# Patient Record
Sex: FEMALE | Race: WHITE | Employment: FULL TIME | URBAN - METROPOLITAN AREA
[De-identification: names, ages, dates, MRNs, and addresses within clinical notes are randomized per-mention and may not be internally consistent; named-entity substitution may affect disease eponyms.]

---

## 2018-11-22 ENCOUNTER — APPOINTMENT (OUTPATIENT)
Dept: CT IMAGING | Age: 48
End: 2018-11-22
Payer: COMMERCIAL

## 2018-11-22 ENCOUNTER — HOSPITAL ENCOUNTER (OUTPATIENT)
Age: 48
Setting detail: OBSERVATION
Discharge: HOME | End: 2018-11-23
Attending: EMERGENCY MEDICINE | Admitting: INTERNAL MEDICINE
Payer: COMMERCIAL

## 2018-11-22 DIAGNOSIS — R42 VERTIGO: Primary | ICD-10-CM

## 2018-11-22 DIAGNOSIS — R11.2 NON-INTRACTABLE VOMITING WITH NAUSEA, UNSPECIFIED VOMITING TYPE: ICD-10-CM

## 2018-11-22 LAB
A/G RATIO: 1.4 (ref 1.1–2.2)
ALBUMIN SERPL-MCNC: 3.9 G/DL (ref 3.4–5)
ALP BLD-CCNC: 49 U/L (ref 40–129)
ALT SERPL-CCNC: 22 U/L (ref 10–40)
ANION GAP SERPL CALCULATED.3IONS-SCNC: 9 MMOL/L (ref 3–16)
AST SERPL-CCNC: 21 U/L (ref 15–37)
BASOPHILS ABSOLUTE: 0 K/UL (ref 0–0.2)
BASOPHILS RELATIVE PERCENT: 0.7 %
BILIRUB SERPL-MCNC: 0.9 MG/DL (ref 0–1)
BUN BLDV-MCNC: 12 MG/DL (ref 7–20)
CALCIUM SERPL-MCNC: 8.6 MG/DL (ref 8.3–10.6)
CHLORIDE BLD-SCNC: 100 MMOL/L (ref 99–110)
CO2: 24 MMOL/L (ref 21–32)
CREAT SERPL-MCNC: 0.6 MG/DL (ref 0.6–1.1)
EOSINOPHILS ABSOLUTE: 0 K/UL (ref 0–0.6)
EOSINOPHILS RELATIVE PERCENT: 0.9 %
GFR AFRICAN AMERICAN: >60
GFR NON-AFRICAN AMERICAN: >60
GLOBULIN: 2.8 G/DL
GLUCOSE BLD-MCNC: 104 MG/DL (ref 70–99)
HCG QUALITATIVE: NEGATIVE
HCT VFR BLD CALC: 41.9 % (ref 36–48)
HEMOGLOBIN: 14.4 G/DL (ref 12–16)
LYMPHOCYTES ABSOLUTE: 0.9 K/UL (ref 1–5.1)
LYMPHOCYTES RELATIVE PERCENT: 17.2 %
MCH RBC QN AUTO: 29.3 PG (ref 26–34)
MCHC RBC AUTO-ENTMCNC: 34.3 G/DL (ref 31–36)
MCV RBC AUTO: 85.3 FL (ref 80–100)
MONOCYTES ABSOLUTE: 0.4 K/UL (ref 0–1.3)
MONOCYTES RELATIVE PERCENT: 8.3 %
NEUTROPHILS ABSOLUTE: 3.8 K/UL (ref 1.7–7.7)
NEUTROPHILS RELATIVE PERCENT: 72.9 %
PDW BLD-RTO: 13.5 % (ref 12.4–15.4)
PLATELET # BLD: 230 K/UL (ref 135–450)
PMV BLD AUTO: 8.9 FL (ref 5–10.5)
POTASSIUM SERPL-SCNC: 3.9 MMOL/L (ref 3.5–5.1)
RBC # BLD: 4.91 M/UL (ref 4–5.2)
SODIUM BLD-SCNC: 133 MMOL/L (ref 136–145)
TOTAL PROTEIN: 6.7 G/DL (ref 6.4–8.2)
WBC # BLD: 5.2 K/UL (ref 4–11)

## 2018-11-22 PROCEDURE — 2580000003 HC RX 258: Performed by: PHYSICIAN ASSISTANT

## 2018-11-22 PROCEDURE — 96374 THER/PROPH/DIAG INJ IV PUSH: CPT

## 2018-11-22 PROCEDURE — 93005 ELECTROCARDIOGRAM TRACING: CPT | Performed by: EMERGENCY MEDICINE

## 2018-11-22 PROCEDURE — 94761 N-INVAS EAR/PLS OXIMETRY MLT: CPT

## 2018-11-22 PROCEDURE — 70450 CT HEAD/BRAIN W/O DYE: CPT

## 2018-11-22 PROCEDURE — 84443 ASSAY THYROID STIM HORMONE: CPT

## 2018-11-22 PROCEDURE — 84703 CHORIONIC GONADOTROPIN ASSAY: CPT

## 2018-11-22 PROCEDURE — 6370000000 HC RX 637 (ALT 250 FOR IP): Performed by: PHYSICIAN ASSISTANT

## 2018-11-22 PROCEDURE — G0378 HOSPITAL OBSERVATION PER HR: HCPCS

## 2018-11-22 PROCEDURE — 2580000003 HC RX 258: Performed by: INTERNAL MEDICINE

## 2018-11-22 PROCEDURE — 6360000002 HC RX W HCPCS: Performed by: PHYSICIAN ASSISTANT

## 2018-11-22 PROCEDURE — 6360000002 HC RX W HCPCS: Performed by: EMERGENCY MEDICINE

## 2018-11-22 PROCEDURE — 84439 ASSAY OF FREE THYROXINE: CPT

## 2018-11-22 PROCEDURE — 80053 COMPREHEN METABOLIC PANEL: CPT

## 2018-11-22 PROCEDURE — 85025 COMPLETE CBC W/AUTO DIFF WBC: CPT

## 2018-11-22 PROCEDURE — 96361 HYDRATE IV INFUSION ADD-ON: CPT

## 2018-11-22 PROCEDURE — 96375 TX/PRO/DX INJ NEW DRUG ADDON: CPT

## 2018-11-22 PROCEDURE — 99285 EMERGENCY DEPT VISIT HI MDM: CPT

## 2018-11-22 RX ORDER — POTASSIUM CHLORIDE 7.45 MG/ML
10 INJECTION INTRAVENOUS PRN
Status: DISCONTINUED | OUTPATIENT
Start: 2018-11-22 | End: 2018-11-23 | Stop reason: HOSPADM

## 2018-11-22 RX ORDER — SODIUM CHLORIDE 9 MG/ML
INJECTION, SOLUTION INTRAVENOUS CONTINUOUS
Status: DISCONTINUED | OUTPATIENT
Start: 2018-11-22 | End: 2018-11-23

## 2018-11-22 RX ORDER — LORAZEPAM 2 MG/ML
1 INJECTION INTRAMUSCULAR ONCE
Status: COMPLETED | OUTPATIENT
Start: 2018-11-22 | End: 2018-11-22

## 2018-11-22 RX ORDER — POTASSIUM CHLORIDE 750 MG/1
40 TABLET, EXTENDED RELEASE ORAL PRN
Status: DISCONTINUED | OUTPATIENT
Start: 2018-11-22 | End: 2018-11-23 | Stop reason: HOSPADM

## 2018-11-22 RX ORDER — 0.9 % SODIUM CHLORIDE 0.9 %
1000 INTRAVENOUS SOLUTION INTRAVENOUS ONCE
Status: COMPLETED | OUTPATIENT
Start: 2018-11-22 | End: 2018-11-22

## 2018-11-22 RX ORDER — MECLIZINE HYDROCHLORIDE 25 MG/1
25 TABLET ORAL EVERY 6 HOURS SCHEDULED
Status: DISCONTINUED | OUTPATIENT
Start: 2018-11-23 | End: 2018-11-23 | Stop reason: HOSPADM

## 2018-11-22 RX ORDER — MECLIZINE HYDROCHLORIDE CHEWABLE TABLETS 25 MG/1
50 TABLET, CHEWABLE ORAL ONCE
Status: COMPLETED | OUTPATIENT
Start: 2018-11-22 | End: 2018-11-22

## 2018-11-22 RX ORDER — SODIUM CHLORIDE 0.9 % (FLUSH) 0.9 %
10 SYRINGE (ML) INJECTION EVERY 12 HOURS SCHEDULED
Status: DISCONTINUED | OUTPATIENT
Start: 2018-11-22 | End: 2018-11-23 | Stop reason: HOSPADM

## 2018-11-22 RX ORDER — ONDANSETRON 2 MG/ML
4 INJECTION INTRAMUSCULAR; INTRAVENOUS ONCE
Status: COMPLETED | OUTPATIENT
Start: 2018-11-22 | End: 2018-11-22

## 2018-11-22 RX ORDER — ONDANSETRON 2 MG/ML
4 INJECTION INTRAMUSCULAR; INTRAVENOUS EVERY 6 HOURS PRN
Status: DISCONTINUED | OUTPATIENT
Start: 2018-11-22 | End: 2018-11-23 | Stop reason: HOSPADM

## 2018-11-22 RX ORDER — SODIUM CHLORIDE 0.9 % (FLUSH) 0.9 %
10 SYRINGE (ML) INJECTION PRN
Status: DISCONTINUED | OUTPATIENT
Start: 2018-11-22 | End: 2018-11-23 | Stop reason: HOSPADM

## 2018-11-22 RX ORDER — ACETAMINOPHEN 325 MG/1
650 TABLET ORAL EVERY 4 HOURS PRN
Status: DISCONTINUED | OUTPATIENT
Start: 2018-11-22 | End: 2018-11-23 | Stop reason: HOSPADM

## 2018-11-22 RX ADMIN — LORAZEPAM 0.5 MG: 2 INJECTION INTRAMUSCULAR; INTRAVENOUS at 14:34

## 2018-11-22 RX ADMIN — SODIUM CHLORIDE 1000 ML: 9 INJECTION, SOLUTION INTRAVENOUS at 14:35

## 2018-11-22 RX ADMIN — ONDANSETRON HYDROCHLORIDE 4 MG: 2 INJECTION, SOLUTION INTRAVENOUS at 17:45

## 2018-11-22 RX ADMIN — SODIUM CHLORIDE: 9 INJECTION, SOLUTION INTRAVENOUS at 22:37

## 2018-11-22 RX ADMIN — Medication 10 ML: at 22:37

## 2018-11-22 RX ADMIN — MECLIZINE HCL 50 MG: 25 TABLET, CHEWABLE ORAL at 14:35

## 2018-11-22 ASSESSMENT — PAIN DESCRIPTION - PAIN TYPE: TYPE: ACUTE PAIN

## 2018-11-22 ASSESSMENT — PAIN SCALES - GENERAL: PAINLEVEL_OUTOF10: 4

## 2018-11-22 NOTE — ED NOTES
1805- Perfect serve sent Dr. Hortensia Grady.      0200- Call returned by Dr. Hortensia Grady and spoke to Dr. Larisa Irvin  11/22/18 1642

## 2018-11-22 NOTE — ED PROVIDER NOTES
[address] ED  eMERGENCY dEPARTMENT eNCOUnter        Pt Name: Jeremiah Barrow  MRN: [id number]  [place] 1970  Date of evaluation: 11/22/2018  Provider: Fernando Jones PA-C  PCP: No primary care provider on file. This patient WAS seen and evaluated by the attending physician Cortez Rowland MD.            [facility]       Chief Complaint   Patient presents with    Dizziness     pt c/o dizziness and vomiting that started this morning.  Emesis       HISTORY OF PRESENT ILLNESS   (Location/Symptom, Timing/Onset, Context/Setting, Quality, Duration, Modifying Factors, Severity)  Note limiting factors. Jeremiah Barrow is a 53 y.o. female  presents to the emergency department with dizziness and vomiting that started this morning. The patient states that she has had this previously a few years ago and was diagnosed with vertigo. She states that she has no dizziness or symptoms if she is lying still, but when she sits up or walks her symptoms come on. She denies headache, fever, chills, cough, congestion, otalgia, ear pressure, chest pain, shortness of breath or abdominal pain. She denies visual change, numbness, tingling, weakness or confusion. She states that she is having some photophobia. Nursing Notes were all reviewed and agreed with or any disagreements were addressed  in the HPI. REVIEW OF SYSTEMS    (2-9 systems for level 4, 10 or more for level 5)     Review of Systems    Positives and Pertinent negatives as per HPI. Except as noted abovein the ROS, all other systems were reviewed and negative.        PAST MEDICAL HISTORY     Past Medical History:   Diagnosis Date    Anxiety     Depression     Dizziness     Thyroid disease          SURGICAL HISTORY     Past Surgical History:   Procedure Laterality Date    BREAST ENHANCEMENT SURGERY      CHOLECYSTECTOMY      COLONOSCOPY      DILATION AND CURETTAGE OF UTERUS      FACIAL RECONSTRUCTION SURGERY

## 2018-11-23 VITALS
TEMPERATURE: 98.9 F | WEIGHT: 222.9 LBS | SYSTOLIC BLOOD PRESSURE: 124 MMHG | RESPIRATION RATE: 18 BRPM | BODY MASS INDEX: 35.82 KG/M2 | OXYGEN SATURATION: 96 % | HEIGHT: 66 IN | DIASTOLIC BLOOD PRESSURE: 82 MMHG | HEART RATE: 75 BPM

## 2018-11-23 LAB
ANION GAP SERPL CALCULATED.3IONS-SCNC: 8 MMOL/L (ref 3–16)
BUN BLDV-MCNC: 10 MG/DL (ref 7–20)
CALCIUM SERPL-MCNC: 8.1 MG/DL (ref 8.3–10.6)
CHLORIDE BLD-SCNC: 102 MMOL/L (ref 99–110)
CO2: 24 MMOL/L (ref 21–32)
CREAT SERPL-MCNC: 0.6 MG/DL (ref 0.6–1.1)
EKG ATRIAL RATE: 81 BPM
EKG DIAGNOSIS: NORMAL
EKG P AXIS: 23 DEGREES
EKG P-R INTERVAL: 132 MS
EKG Q-T INTERVAL: 376 MS
EKG QRS DURATION: 82 MS
EKG QTC CALCULATION (BAZETT): 436 MS
EKG R AXIS: 32 DEGREES
EKG T AXIS: 194 DEGREES
EKG VENTRICULAR RATE: 81 BPM
GFR AFRICAN AMERICAN: >60
GFR NON-AFRICAN AMERICAN: >60
GLUCOSE BLD-MCNC: 102 MG/DL (ref 70–99)
MAGNESIUM: 2.1 MG/DL (ref 1.8–2.4)
POTASSIUM REFLEX MAGNESIUM: 3.3 MMOL/L (ref 3.5–5.1)
SODIUM BLD-SCNC: 134 MMOL/L (ref 136–145)
T4 FREE: 0.8 NG/DL (ref 0.9–1.8)
TSH REFLEX: 11.42 UIU/ML (ref 0.27–4.2)

## 2018-11-23 PROCEDURE — 6370000000 HC RX 637 (ALT 250 FOR IP): Performed by: INTERNAL MEDICINE

## 2018-11-23 PROCEDURE — G0378 HOSPITAL OBSERVATION PER HR: HCPCS

## 2018-11-23 PROCEDURE — G8979 MOBILITY GOAL STATUS: HCPCS

## 2018-11-23 PROCEDURE — 6360000002 HC RX W HCPCS: Performed by: INTERNAL MEDICINE

## 2018-11-23 PROCEDURE — 97530 THERAPEUTIC ACTIVITIES: CPT

## 2018-11-23 PROCEDURE — 96361 HYDRATE IV INFUSION ADD-ON: CPT

## 2018-11-23 PROCEDURE — 93010 ELECTROCARDIOGRAM REPORT: CPT | Performed by: INTERNAL MEDICINE

## 2018-11-23 PROCEDURE — 97162 PT EVAL MOD COMPLEX 30 MIN: CPT

## 2018-11-23 PROCEDURE — G8980 MOBILITY D/C STATUS: HCPCS

## 2018-11-23 PROCEDURE — 83735 ASSAY OF MAGNESIUM: CPT

## 2018-11-23 PROCEDURE — 36415 COLL VENOUS BLD VENIPUNCTURE: CPT

## 2018-11-23 PROCEDURE — 95992 CANALITH REPOSITIONING PROC: CPT

## 2018-11-23 PROCEDURE — G8978 MOBILITY CURRENT STATUS: HCPCS

## 2018-11-23 PROCEDURE — 99217 PR OBSERVATION CARE DISCHARGE MANAGEMENT: CPT | Performed by: INTERNAL MEDICINE

## 2018-11-23 PROCEDURE — 80048 BASIC METABOLIC PNL TOTAL CA: CPT

## 2018-11-23 RX ORDER — MECLIZINE HYDROCHLORIDE 25 MG/1
25 TABLET ORAL 3 TIMES DAILY
Qty: 30 TABLET | Refills: 0 | Status: SHIPPED | OUTPATIENT
Start: 2018-11-23 | End: 2018-12-03

## 2018-11-23 RX ORDER — ONDANSETRON 4 MG/1
4 TABLET, ORALLY DISINTEGRATING ORAL 3 TIMES DAILY PRN
Qty: 21 TABLET | Refills: 0 | Status: SHIPPED | OUTPATIENT
Start: 2018-11-23 | End: 2019-04-29 | Stop reason: ALTCHOICE

## 2018-11-23 RX ADMIN — MECLIZINE HYDROCHLORIDE 25 MG: 25 TABLET ORAL at 06:35

## 2018-11-23 RX ADMIN — ACETAMINOPHEN 650 MG: 325 TABLET ORAL at 14:38

## 2018-11-23 RX ADMIN — ONDANSETRON 4 MG: 2 INJECTION INTRAMUSCULAR; INTRAVENOUS at 14:38

## 2018-11-23 RX ADMIN — MECLIZINE HYDROCHLORIDE 25 MG: 25 TABLET ORAL at 01:27

## 2018-11-23 RX ADMIN — MECLIZINE HYDROCHLORIDE 25 MG: 25 TABLET ORAL at 11:38

## 2018-11-23 ASSESSMENT — PAIN SCALES - GENERAL: PAINLEVEL_OUTOF10: 4

## 2018-11-23 NOTE — PROGRESS NOTES
Patient resting quietly at this time. PT states they will see her sometime after lunch. Will continue to monitor.

## 2018-11-25 RX ORDER — LEVOTHYROXINE SODIUM 0.05 MG/1
50 TABLET ORAL DAILY
Qty: 30 TABLET | Refills: 2 | Status: SHIPPED | OUTPATIENT
Start: 2018-11-25

## 2019-04-29 ENCOUNTER — APPOINTMENT (OUTPATIENT)
Dept: CT IMAGING | Age: 49
End: 2019-04-29
Payer: COMMERCIAL

## 2019-04-29 ENCOUNTER — HOSPITAL ENCOUNTER (EMERGENCY)
Age: 49
Discharge: HOME | End: 2019-04-29
Attending: EMERGENCY MEDICINE
Payer: COMMERCIAL

## 2019-04-29 VITALS
RESPIRATION RATE: 16 BRPM | OXYGEN SATURATION: 98 % | SYSTOLIC BLOOD PRESSURE: 127 MMHG | DIASTOLIC BLOOD PRESSURE: 78 MMHG | TEMPERATURE: 99.2 F | HEART RATE: 84 BPM

## 2019-04-29 DIAGNOSIS — R10.32 ABDOMINAL PAIN, LEFT LOWER QUADRANT: Primary | ICD-10-CM

## 2019-04-29 DIAGNOSIS — K52.9 COLITIS: ICD-10-CM

## 2019-04-29 LAB
A/G RATIO: 1.3 (ref 1.1–2.2)
ALBUMIN SERPL-MCNC: 3.9 G/DL (ref 3.4–5)
ALP BLD-CCNC: 45 U/L (ref 40–129)
ALT SERPL-CCNC: 10 U/L (ref 10–40)
AMORPHOUS: ABNORMAL /HPF
ANION GAP SERPL CALCULATED.3IONS-SCNC: 12 MMOL/L (ref 3–16)
AST SERPL-CCNC: 10 U/L (ref 15–37)
BACTERIA: ABNORMAL /HPF
BASOPHILS ABSOLUTE: 0.1 K/UL (ref 0–0.2)
BASOPHILS RELATIVE PERCENT: 0.7 %
BILIRUB SERPL-MCNC: 1.2 MG/DL (ref 0–1)
BILIRUBIN URINE: ABNORMAL
BLOOD, URINE: ABNORMAL
BUN BLDV-MCNC: 9 MG/DL (ref 7–20)
CALCIUM SERPL-MCNC: 8.4 MG/DL (ref 8.3–10.6)
CHLORIDE BLD-SCNC: 99 MMOL/L (ref 99–110)
CLARITY: ABNORMAL
CO2: 25 MMOL/L (ref 21–32)
COLOR: ABNORMAL
CREAT SERPL-MCNC: 0.7 MG/DL (ref 0.6–1.1)
EOSINOPHILS ABSOLUTE: 0.1 K/UL (ref 0–0.6)
EOSINOPHILS RELATIVE PERCENT: 1 %
EPITHELIAL CELLS, UA: ABNORMAL /HPF
GFR AFRICAN AMERICAN: >60
GFR NON-AFRICAN AMERICAN: >60
GLOBULIN: 3.1 G/DL
GLUCOSE BLD-MCNC: 122 MG/DL (ref 70–99)
GLUCOSE URINE: NEGATIVE MG/DL
HCG QUALITATIVE: NEGATIVE
HCT VFR BLD CALC: 36.1 % (ref 36–48)
HEMOGLOBIN: 12.5 G/DL (ref 12–16)
KETONES, URINE: NEGATIVE MG/DL
LEUKOCYTE ESTERASE, URINE: NEGATIVE
LYMPHOCYTES ABSOLUTE: 1.3 K/UL (ref 1–5.1)
LYMPHOCYTES RELATIVE PERCENT: 12.1 %
MCH RBC QN AUTO: 29 PG (ref 26–34)
MCHC RBC AUTO-ENTMCNC: 34.6 G/DL (ref 31–36)
MCV RBC AUTO: 83.8 FL (ref 80–100)
MICROSCOPIC EXAMINATION: YES
MONOCYTES ABSOLUTE: 0.9 K/UL (ref 0–1.3)
MONOCYTES RELATIVE PERCENT: 8.4 %
MUCUS: ABNORMAL /LPF
NEUTROPHILS ABSOLUTE: 8.7 K/UL (ref 1.7–7.7)
NEUTROPHILS RELATIVE PERCENT: 77.8 %
NITRITE, URINE: NEGATIVE
OCCULT BLOOD DIAGNOSTIC: NORMAL
PDW BLD-RTO: 13.4 % (ref 12.4–15.4)
PH UA: 5.5 (ref 5–8)
PLATELET # BLD: 278 K/UL (ref 135–450)
PMV BLD AUTO: 7.5 FL (ref 5–10.5)
POTASSIUM SERPL-SCNC: 3.6 MMOL/L (ref 3.5–5.1)
PROTEIN UA: ABNORMAL MG/DL
RBC # BLD: 4.31 M/UL (ref 4–5.2)
RBC UA: ABNORMAL /HPF (ref 0–2)
SODIUM BLD-SCNC: 136 MMOL/L (ref 136–145)
SPECIFIC GRAVITY UA: 1.02 (ref 1–1.03)
TOTAL PROTEIN: 7 G/DL (ref 6.4–8.2)
URINE TYPE: ABNORMAL
UROBILINOGEN, URINE: 0.2 E.U./DL
WBC # BLD: 11.2 K/UL (ref 4–11)
WBC UA: ABNORMAL /HPF (ref 0–5)

## 2019-04-29 PROCEDURE — 85025 COMPLETE CBC W/AUTO DIFF WBC: CPT

## 2019-04-29 PROCEDURE — 84703 CHORIONIC GONADOTROPIN ASSAY: CPT

## 2019-04-29 PROCEDURE — 80053 COMPREHEN METABOLIC PANEL: CPT

## 2019-04-29 PROCEDURE — 74177 CT ABD & PELVIS W/CONTRAST: CPT

## 2019-04-29 PROCEDURE — 99284 EMERGENCY DEPT VISIT MOD MDM: CPT

## 2019-04-29 PROCEDURE — 81001 URINALYSIS AUTO W/SCOPE: CPT

## 2019-04-29 PROCEDURE — G0328 FECAL BLOOD SCRN IMMUNOASSAY: HCPCS

## 2019-04-29 PROCEDURE — 6360000004 HC RX CONTRAST MEDICATION: Performed by: EMERGENCY MEDICINE

## 2019-04-29 PROCEDURE — 6370000000 HC RX 637 (ALT 250 FOR IP): Performed by: EMERGENCY MEDICINE

## 2019-04-29 RX ORDER — CIPROFLOXACIN 500 MG/1
500 TABLET, FILM COATED ORAL ONCE
Status: COMPLETED | OUTPATIENT
Start: 2019-04-29 | End: 2019-04-29

## 2019-04-29 RX ORDER — CIPROFLOXACIN 2 MG/ML
400 INJECTION, SOLUTION INTRAVENOUS ONCE
Status: DISCONTINUED | OUTPATIENT
Start: 2019-04-29 | End: 2019-04-29

## 2019-04-29 RX ORDER — METRONIDAZOLE 250 MG/1
500 TABLET ORAL 3 TIMES DAILY
Qty: 60 TABLET | Refills: 0 | Status: SHIPPED | OUTPATIENT
Start: 2019-04-29 | End: 2019-05-09

## 2019-04-29 RX ORDER — METRONIDAZOLE 250 MG/1
500 TABLET ORAL ONCE
Status: COMPLETED | OUTPATIENT
Start: 2019-04-29 | End: 2019-04-29

## 2019-04-29 RX ORDER — LORAZEPAM 0.5 MG/1
0.5 TABLET ORAL PRN
COMMUNITY
Start: 2017-10-02

## 2019-04-29 RX ORDER — CIPROFLOXACIN 500 MG/1
500 TABLET, FILM COATED ORAL 2 TIMES DAILY
Qty: 20 TABLET | Refills: 0 | Status: SHIPPED | OUTPATIENT
Start: 2019-04-29 | End: 2019-05-09

## 2019-04-29 RX ADMIN — CIPROFLOXACIN HYDROCHLORIDE 500 MG: 500 TABLET, FILM COATED ORAL at 13:35

## 2019-04-29 RX ADMIN — METRONIDAZOLE 500 MG: 250 TABLET ORAL at 13:35

## 2019-04-29 RX ADMIN — IOPAMIDOL 75 ML: 755 INJECTION, SOLUTION INTRAVENOUS at 10:57

## 2019-04-29 ASSESSMENT — ENCOUNTER SYMPTOMS
RHINORRHEA: 0
CHEST TIGHTNESS: 0
WHEEZING: 0
VOMITING: 0
RECTAL PAIN: 1
DIARRHEA: 0
TROUBLE SWALLOWING: 0
STRIDOR: 0
COUGH: 0
SORE THROAT: 0
ABDOMINAL PAIN: 1
SHORTNESS OF BREATH: 0

## 2019-04-29 ASSESSMENT — PAIN SCALES - GENERAL: PAINLEVEL_OUTOF10: 7

## 2019-04-29 ASSESSMENT — PAIN DESCRIPTION - ORIENTATION: ORIENTATION: LEFT;LOWER

## 2019-04-29 ASSESSMENT — PAIN DESCRIPTION - PAIN TYPE: TYPE: ACUTE PAIN

## 2019-04-29 ASSESSMENT — PAIN DESCRIPTION - LOCATION: LOCATION: ABDOMEN

## 2019-04-29 NOTE — ED NOTES
Rectal exam done by  [facility] with [facility] tech at bedside.       Cholo Saeed, RN  04/29/19 8769

## 2019-04-29 NOTE — ED PROVIDER NOTES
[address] [facility]  ED  eMERGENCYdEPARTMENT eNCOUnter      Pt Name: Cal Mayes  MRN: [id number]  [place] 1970  Date of evaluation: 4/29/2019  Macho Serra MD    [address] [place] [address]       Chief Complaint   Patient presents with    Abdominal Pain     LLQ abd pain intermittently for several weeks. severe x 3 days. has also had some rectal pain         HISTORY OF PRESENT ILLNESS   (Location/Symptom, Timing/Onset,Context/Setting, Quality, Duration, Modifying Factors, Severity)  Note limiting factors. Cal Mayes is a 51 y.o. female who presents to the emergency department for abdominal pain. Patient reports intermittent 'abdominal discomfort' to LLQ, sharp/stabbing that started on Thursday, reports improvement with Ibuprofen however has been ogoing x 2-3 months. . Denies aggravating factors.   -Also reports rectal pain, states when she sits she feels like sharp pains 'up the rectum.' Had diarrhea last night, improved with imodium. patient reports associated rectal pain, pain on defecation and pain when sitting. HPI    Nursing Notes were reviewed. REVIEW OF SYSTEMS    (2-9 systems for level 4, 10 or more for level 5)     Review of Systems   Constitutional: Negative for activity change, appetite change, diaphoresis and fever. HENT: Negative for congestion, rhinorrhea, sore throat and trouble swallowing. Respiratory: Negative for cough, chest tightness, shortness of breath, wheezing and stridor. Cardiovascular: Negative for chest pain and palpitations. Gastrointestinal: Positive for abdominal pain and rectal pain. Negative for diarrhea and vomiting. Genitourinary: Negative for dysuria and flank pain. Skin: Negative for rash and wound. Neurological: Negative for dizziness, weakness, light-headedness, numbness and headaches. Except as noted above the remainder of the review of systems was reviewedand negative.        PAST MEDICAL HISTORY     Past Medical History: Diagnosis Date    Anxiety     Depression     Dizziness     Thyroid disease          SURGICAL HISTORY       Past Surgical History:   Procedure Laterality Date    BREAST ENHANCEMENT SURGERY      CHOLECYSTECTOMY      COLONOSCOPY      DILATION AND CURETTAGE OF UTERUS      FACIAL RECONSTRUCTION SURGERY  1999    dog attack    TUBAL LIGATION      WISDOM TOOTH EXTRACTION           CURRENT MEDICATIONS       Discharge Medication List as of 4/29/2019 12:37 PM      CONTINUE these medications which have NOT CHANGED    Details   LORazepam (ATIVAN) 0.5 MG tablet Take 0.5 mg by mouth as needed. Historical Med      levothyroxine (SYNTHROID) 50 MCG tablet Take 1 tablet by mouth daily, Disp-30 tablet, R-2Normal             ALLERGIES     Codeine; Compazine [prochlorperazine maleate]; Doxycycline; Percocet [oxycodone-acetaminophen]; and Vicodin [hydrocodone-acetaminophen]    FAMILY HISTORY     History reviewed. No pertinent family history.        SOCIAL HISTORY       Social History     Socioeconomic History    Marital status:      Spouse name: None    Number of children: None    Years of education: None    Highest education level: None   Occupational History    None   Social Needs    Financial resource strain: None    Food insecurity:     Worry: None     Inability: None    Transportation needs:     Medical: None     Non-medical: None   Tobacco Use    Smoking status: Never Smoker    Smokeless tobacco: Never Used   Substance and Sexual Activity    Alcohol use: No    Drug use: No    Sexual activity: None   Lifestyle    Physical activity:     Days per week: None     Minutes per session: None    Stress: None   Relationships    Social connections:     Talks on phone: None     Gets together: None     Attends Jew service: None     Active member of club or organization: None     Attends meetings of clubs or organizations: None     Relationship status: None    Intimate partner violence:     Fear of - Abnormal; Notable for the following components:    Mucus, UA 3+ (*)     Bacteria, UA 2+ (*)     Amorphous, UA Rare (*)     All other components within normal limits    Narrative:     Performed at:  [address], [facility]   Phone [phone]   HCG, SERUM, QUALITATIVE    Narrative:     Performed at:  [address], [facility]   Phone [phone]   BLOOD OCCULT STOOL DIAGNOSTIC    Narrative:     ORDER#: [id number]                          ORDERED BY: Lora Camara  SOURCE: Stool                              COLLECTED:  04/29/19 11:59  ANTIBIOTICS AT ELIER.:                      RECEIVED :  04/29/19 12:07  Performed at:  [address], [facility]   Phone [phone]       All other labs were within normal range ornot returned as of this dictation. EMERGENCY DEPARTMENT COURSE and DIFFERENTIAL DIAGNOSIS/MDM:   Vitals:    Vitals:    04/29/19 0951 04/29/19 1140 04/29/19 1355   BP: (!) 138/95 134/75 127/78   Pulse: 97 84 84   Resp: 18 16 16   Temp: 99.2 °F (37.3 °C)     TempSrc: Oral     SpO2: 98% 99% 98%         MDM    ED COURSE/MDM    -Millie Collet is a 51 y.o. female presents to ED for LLQ and rectal pain x 2-3 months.   -Patient seen and evaluated. Oldrecords reviewed. Labs and imaging reviewed and results discussed with patient. Workup was significant for mildly elevated bilirubin of 1.2 and mild leukocytosis of 11.2  -negative ua  -negative hemoccult  -CT ap: Diffuse sigmoid colitis without evidence for viscus perforation. Surrounding inflammation and an capsulated fluid. Direct visualization is should be considered following appropriate therapy to exclude underlying mass.    -patient refused pain medication   -was given ciprofloxacin and flagyl in ED  -Discussed finding of colitis on CT and plan for discharge home with close follow up with PCP/GI referral and rx ciprofloxacin and flagyl was discussed with patient and family. Strict ED return precautions given for new/worsending symptoms. Patient and family in agreement withplan, verbally confirm understanding and have no further questions/concerns. REASSESSMENT      Well appearing, non toxic, alert, oriented speaking in full sentences and hemodynamically stable upon discharge      CRITICAL CARE TIME   Total Critical Care time was 0 minutes, excluding separately reportableprocedures. There was a high probability of clinicallysignificant/life threatening deterioration in the patient's condition which required my urgent intervention. CONSULTS:  None    PROCEDURES:  Unless otherwise noted below, none     Procedures    FINAL IMPRESSION      1. Abdominal pain, left lower quadrant    2.  Colitis          DISPOSITION/PLAN   DISPOSITION        PATIENT REFERREDTO:  [address] [facility]  [phone]    Call in 1 day        DISCHARGE MEDICATIONS:  Discharge Medication List as of 4/29/2019 12:37 PM      START taking these medications    Details   ciprofloxacin (CIPRO) 500 MG tablet Take 1 tablet by mouth 2 times daily for 10 days, Disp-20 tablet, R-0Print      metroNIDAZOLE (FLAGYL) 250 MG tablet Take 2 tablets by mouth 3 times daily for 10 days, Disp-60 tablet, R-0Print                (Please note that portions of this note were completed with a voice recognition program.  Efforts were made to edit the dictations but occasionally wordsare mis-transcribed.)    Luís Campos MD (electronically signed)  Attending Emergency Physician            Luís Campos MD  04/29/19 [place]

## 2019-04-29 NOTE — ED NOTES
Pt states that she has been having left sided abdominal pain off and on for the past few months and had diarrhea on Friday evening and that was her last bowel movement. Pt states that this episode started on Thursday.       Lupe Edmond RN  04/29/19 6167

## 2019-04-29 NOTE — ED NOTES
[facility] at bedside to see pt.  Assessed per MD.        Cholo Saeed, RN  04/29/19 5787

## 2019-07-11 ENCOUNTER — HOSPITAL ENCOUNTER (OUTPATIENT)
Dept: PHYSICAL THERAPY | Age: 49
Setting detail: THERAPIES SERIES
Discharge: HOME | End: 2019-07-11
Payer: COMMERCIAL

## 2019-07-18 ENCOUNTER — HOSPITAL ENCOUNTER (OUTPATIENT)
Dept: PHYSICAL THERAPY | Age: 49
Setting detail: THERAPIES SERIES
Discharge: HOME | End: 2019-07-18
Payer: COMMERCIAL

## 2019-07-18 PROCEDURE — 97112 NEUROMUSCULAR REEDUCATION: CPT

## 2019-07-18 NOTE — PROGRESS NOTES
The following patient has been evaluated for physical therapy services. In order for therapy to continue treatment, Medicare requires physician review of the treatment plan. Please review the attached evaluation and/or summary of the patient's plan of care, and verify that you agree therapy should continue by signing below and sending it back to our office. Thank you for this referral.    Physician signature_______________________ Date________________    Fax to:   [facility] [phone]                PHYSICAL THERAPY VESTIBULAR EVALUATION    Evaluation Date:  7/18/2019         Patient Name:  Jody Edmonds       YOB: 1970       Medical Diagnosis: Dizziness        ICD 10:  R42    Treatment Diagnosis:  Dizziness with R side BPPV    Onset Date: 7/7/19     Referral Date:  Self referred. Will route evaluation to PCP     Referring Physician:          Visits Allowed/Insurance/Certification Information:   [employer]    Restrictions/Precautions:  No restrictions    Social support/Environment:     Family/caregiver support:   [x]Yes   []No    Health History reviewed with pt:    [x]Yes     []No  Hx of HTN? []Yes     [x]No  Hx of cardiovascular problems? []Yes     [x]No  Hx of CVA/TIA? []Yes     [x]No     Patient goal for therapy:  \" get rid of this dizziness \"      SUBJECTIVE FINDINGS      History of Present Illness:    Pt states that she began to feel dizzy on Sunday of this week. She has had several spells of BPPV in the past, and this feel exactly the same at those episodes. For one of those spells, she was admitted to the hospital, and was treated for BPPV as an inpatient. She states that each time, the repositioning technique has fixed the problem. Presents today with similar symptoms. Dizziness is provoked with change of position, or with looking up.   States that she would like to get this fixed so that she does not end up in []Yes     [x]No  Describe:   (Time of day, activity, location, frequency, injury sustained)    Does pt complain of stumble, stagger, or side step while walking? [x]Yes     []No  Does pt complain of drift to one side while walking? []Yes     [x]No    Limitations (hearing/vision loss/other):    Does pt wear glasses/contacts? []Yes     [x]No  []Reading     []Distance     []Bifocals     []Trifocals     []Prisms   Does pt have chronic hearing loss? []Yes     [x]No  Describe:   Does pt wear hearing aids? []Yes     [x]No    Pain:     []Yes     [x]No  Location?        Pain rating currently:     /10  Pain rating at its worst:     /10       OBJECTIVE FINDINGS      Posture/Alignment:  WNL    Cervical AROM:    [x]WNL     []WFL     []Impaired     []Painful  If impaired, describe:     Vertebral Artery test in sitting position:     [x]Negative     []Positive   []Not tested    Oculomotor Examination:    Room Light:   Spontaneous Nystagmus:  []Yes   [x]No     Direction:    Gaze Holding Nystagmus:  []Yes   [x]No     []Direction fixed    []Direction changing  Eye Movement Range:  [x]Conjugate     []Disconjugate     []Diplopia at end range  Vergence:       [x]WNL     []Abnormal (diplopia or disconjugate @ >10 cm)  Smooth Pursuits:     [x]WNL    []Abnormal/saccadic intrusions noted     Describe:     []horizontal pursuit   []vertical pursuit       Saccades:       [x]WNL (2 or less)     []Slow velocity     []Impaired accuracy    (overshooting/gross undershooting)  VOR (Cancellation):  [x]WNL     []Abnormal (saccadic intrusions/lack of fixation)  VOR (Slow):      [x]WNL     []Abnormal  Head Impulse Test/ Head Thrust Test:     [x]Negative         []Positive to the right     []Positive to the left         []Positive with far target to the  []Right     []Left     Coordination Testing:    Finger to Nose:        [x]WNL     []Impaired  Alternating pronation/supination:  [x]WNL     []Impaired  Heel to shin (sitting or

## 2019-07-18 NOTE — FLOWSHEET NOTE
minutes    Functional Outcome Measure:     Measure Used: [facility]  Date Assessed:7/11/19  Score: 36    Assessment/Treatment/Activity Tolerance:    Patients response to treatment:   [x]Patient tolerated treatment well []Patient limited by fatigue   []Patient limited by pain  []Patient limited by other medical complications   []Other:     GOALS      Short Term Goals:  2   weeks Long Term Goals:   4 weeks   1). Tolerate repositioning  1). Negative repeat assesments for BPPV in all canals   2). Establish HEP 2). Indep with HEP   3). Decreased functional complaints due to dizziness 3). Return to full functional mobility with no limitation due to dizziness   4).           Prognosis: [x]Good   []Fair   []Poor    Patient Requires Follow-up:  [x]Yes  []No    Plan: [x]Plan of care initiated     []Continue per plan of care    [] Alter current plan (see comments)    []Hold pending MD visit []Discharge    Timed Code Treatment Minutes:  30    Total Treatment Minutes:  60      Electronically signed by:  Julieta Torres, PT, MPT, OMT-c [id number]

## 2019-10-24 ENCOUNTER — HOSPITAL ENCOUNTER (OUTPATIENT)
Dept: PHYSICAL THERAPY | Age: 49
Setting detail: THERAPIES SERIES
Discharge: HOME | End: 2019-10-24
Payer: COMMERCIAL

## 2019-10-24 PROCEDURE — 95992 CANALITH REPOSITIONING PROC: CPT

## 2019-10-24 PROCEDURE — 97162 PT EVAL MOD COMPLEX 30 MIN: CPT

## 2020-03-12 ENCOUNTER — HOSPITAL ENCOUNTER (OUTPATIENT)
Dept: PHYSICAL THERAPY | Age: 50
Setting detail: THERAPIES SERIES
Discharge: HOME | End: 2020-03-12
Payer: COMMERCIAL

## 2020-03-12 PROCEDURE — 95992 CANALITH REPOSITIONING PROC: CPT

## 2020-03-12 PROCEDURE — 97112 NEUROMUSCULAR REEDUCATION: CPT

## 2020-03-12 PROCEDURE — 97162 PT EVAL MOD COMPLEX 30 MIN: CPT

## 2020-03-12 NOTE — PROGRESS NOTES
[x]Spontaneous    [x]Induced by motion     []Induced by position changes    Pt experiences disequilibrium? [x]Yes     []No    Symptoms worse with:     []Movement of the visual environment     [x]Self motion  []Complex visual environments     []Visual patterns     []Visual tasks (reading)    Associated hearing/ ear symptoms:      []Yes     [x]No    Does pt have chronic hearing loss? []Yes     [x]No    Does pt wear hearing aids? []Yes     [x]No    Any recent illness or infections? []Yes     [x]No    Any recent accidents or head trauma? []Yes     [x]No    Any history of migraines or headaches? []Yes     [x]No    Current Functional Limitations:     Functional complaints:   Unalbe to walk without loss of balance,  Cannot look down or bend forward due to nausea and severe vertigo   PLOF:   Fully Indep      Pt's sleep is affected:  Room spins with rolling to side. History of Prior Therapy/Testing (e.g. MRI):  Multiple past bouts of Vertigo, testing (+) for BPPV each episode. Treated and responds well to CRT. Medications:    Pt h/o or currently taking any vestibular suppressants? [x]No , did take zofran due to nausea     Pt aware of any medications that may cause dizziness? []Yes     [x]No      History of Falls or near Falls:    Any falls to the ground? []Yes     [x]No  Any near falls? []Yes     [x]No  Does pt complain of stumble, stagger, or side step while walking? []Yes     [x]No  Does pt complain of drift to one side while walking?         []Yes     [x]No    Pain     [x]NA       OBJECTIVE FINDINGS      Cervical AROM:    [x]WNL        Vertebral Artery test in sitting position:     [x]Negative         Positional Testing:     Right Ximena Hallpike:    []Negative     [x]Vertigo     [x]Nystagmus     Describe:  torsional upbeating with 20 sec latency period lasting greater than 30 sec  Left [place] Hallpike:      []Negative     [x]Vertigo     [x]Nystagmus     Describe: Lateral   Right Roll test:      []Negative     [x]Vertigo     [x]Nystagmus     Describe:  Geotropic   Left Roll test:      []Negative     [x]Vertigo     [x]Nystagmus     Describe:  Grotropic, more pronounced than R. Provokes nausea  Head Center Test:    [x]Negative     []Vertigo     []Nystagmus     Describe:       ASSESSMENT  : Pt presents this date with c/o sudden onset of room spinning vertigo that began yesterday after a long car ride. Pt did attempt to reposition the crystals by doing a self CRT, but still feels very dizzy. Assessment this date reveals multi canalithiasis with the greatest provocation occurring with the LEFT Roll test.     Problems  [x]Positive for BPPV   [x]Positive for motion sensitivity      Rehabilitation Potential:  Good for goals listed below. Strengths for achieving goals include:  [x]Pt motivated   []PLOF   []Family support   Limitations for achieving goals include:  []None   [x]Severity of condition     []Cognitive   []Lack of family support   []Lack of resources     []Other:         Prognosis: [x]Good   []Fair   []Poor    GOALS      Short Term Goals:   2  weeks Long Term Goals:  4  weeks   1). Reassess for BPPV 1). Negative in all planes   2). Decreased subjective c/o dizziness 2). No further c/o dizziness    3). 3).   4). 4).   5). 5).   6). 6). PLAN OF CARE  To see patient  1-x/week for  4 weeks for the following treatment interventions:    [x]Canalith Repositioning Procedures for BPPV  [x]Neuromuscular Re-education:  []Gaze Stability Ex  []Balance Ex   []Habituation Ex  []Therapeutic Exercise   []Gait Training   []Manual Therapy  []Therapeutic Activity   []Other: Thank you for the referral of this patient.       Timed Code Treatment Minutes:  30   minutes     Total Treatment Time:  [address] PT, MPT, OMT-c [id number]

## 2020-03-13 ENCOUNTER — APPOINTMENT (OUTPATIENT)
Dept: PHYSICAL THERAPY | Age: 50
End: 2020-03-13
Payer: COMMERCIAL

## 2020-03-14 NOTE — FLOWSHEET NOTE
Outpatient Physical Therapy     [x] Daily Treatment Note   [] Progress Note   [] Discharge Note    Date:  3/14/2020    Patient Name:  Hussein Killian         YOB: 1970    Medical Diagnosis/ICD 10:  Vertigo R42     Treatment Diagnosis:  L Horizontal canalithiasis BPPV       Onset Date:                3/11/2020     Referral Date:            3/11/2020     Referring Physician:   Self referral via direct access                                                    Visits Allowed/Insurance/Certification Information:  [employer]      Restrictions/Precautions:  No restrictions   Plan of care sent to provider:      [x]Faxed  []Co-signature    (attempts: 1[x] 2 []3[])         Plan of care signed:      []Yes date:            []No      Progress Note covers period from (if applicable):    [x]NA    []From          To           Next Progress Note due:  4/13/20     Visit# / total visits:  1/ 4    Plan for Next Session:   Reassess for BPPV      Subjective:  See eval     Pain level: No C.o   AT EVAL:       Objective:       Exercises:    Exercises in bold performed in department today. Items not bolded are carried forward from prior visits for continuity of the record. Exercise/Equipment Resistance/Repetitions HEP Other comments       []        []        []        []        []        []        []        []      Therapeutic Exercise/Home Exercise Program:   0 minutes    Therapeutic Activity:  0 minutes     Gait: 0 minutes    Neuromuscular Re-Education:  30 minutes  Pt education provided regarding anatomy and physiology associated with dx, etiology of  Symptoms and plan of care. All questions answered to pt satisfaction.      Canalith Repositioning Procedure:  Performed to correct  L Horizontal canal x2; R Horizontal canal x1    Manual Therapy:  0 minutes    Modalities: 0 minutes      Assessment/Treatment/Activity Tolerance:    Patients response to treatment:   [x]Patient tolerated treatment

## 2020-03-17 ENCOUNTER — APPOINTMENT (OUTPATIENT)
Dept: PHYSICAL THERAPY | Age: 50
End: 2020-03-17
Payer: COMMERCIAL

## 2020-12-07 ENCOUNTER — HOSPITAL ENCOUNTER (OUTPATIENT)
Dept: PHYSICAL THERAPY | Age: 50
Setting detail: THERAPIES SERIES
Discharge: HOME | End: 2020-12-07
Payer: COMMERCIAL

## 2020-12-07 ENCOUNTER — HOSPITAL ENCOUNTER (EMERGENCY)
Age: 50
Discharge: HOME | End: 2020-12-07
Attending: EMERGENCY MEDICINE
Payer: COMMERCIAL

## 2020-12-07 ENCOUNTER — APPOINTMENT (OUTPATIENT)
Dept: CT IMAGING | Age: 50
End: 2020-12-07
Payer: COMMERCIAL

## 2020-12-07 VITALS
OXYGEN SATURATION: 96 % | DIASTOLIC BLOOD PRESSURE: 82 MMHG | WEIGHT: 205 LBS | BODY MASS INDEX: 34.16 KG/M2 | HEIGHT: 65 IN | HEART RATE: 78 BPM | SYSTOLIC BLOOD PRESSURE: 130 MMHG | RESPIRATION RATE: 16 BRPM | TEMPERATURE: 97.6 F

## 2020-12-07 LAB
A/G RATIO: 1.7 (ref 1.1–2.2)
ALBUMIN SERPL-MCNC: 4.5 G/DL (ref 3.4–5)
ALP BLD-CCNC: 51 U/L (ref 40–129)
ALT SERPL-CCNC: 14 U/L (ref 10–40)
AMORPHOUS: ABNORMAL /HPF
ANION GAP SERPL CALCULATED.3IONS-SCNC: 7 MMOL/L (ref 3–16)
AST SERPL-CCNC: 15 U/L (ref 15–37)
BACTERIA: ABNORMAL /HPF
BASOPHILS ABSOLUTE: 0.1 K/UL (ref 0–0.2)
BASOPHILS RELATIVE PERCENT: 0.6 %
BILIRUB SERPL-MCNC: 1 MG/DL (ref 0–1)
BILIRUBIN URINE: NEGATIVE
BLOOD, URINE: ABNORMAL
BUN BLDV-MCNC: 11 MG/DL (ref 7–20)
CALCIUM SERPL-MCNC: 9 MG/DL (ref 8.3–10.6)
CHLORIDE BLD-SCNC: 101 MMOL/L (ref 99–110)
CLARITY: CLEAR
CO2: 28 MMOL/L (ref 21–32)
COLOR: YELLOW
CREAT SERPL-MCNC: 0.8 MG/DL (ref 0.6–1.1)
EOSINOPHILS ABSOLUTE: 0.1 K/UL (ref 0–0.6)
EOSINOPHILS RELATIVE PERCENT: 0.8 %
EPITHELIAL CELLS, UA: ABNORMAL /HPF (ref 0–5)
GFR AFRICAN AMERICAN: >60
GFR NON-AFRICAN AMERICAN: >60
GLOBULIN: 2.6 G/DL
GLUCOSE BLD-MCNC: 93 MG/DL (ref 70–99)
GLUCOSE URINE: NEGATIVE MG/DL
HCG QUALITATIVE: NEGATIVE
HCT VFR BLD CALC: 42.9 % (ref 36–48)
HEMOGLOBIN: 14.5 G/DL (ref 12–16)
KETONES, URINE: 15 MG/DL
LEUKOCYTE ESTERASE, URINE: NEGATIVE
LYMPHOCYTES ABSOLUTE: 1.6 K/UL (ref 1–5.1)
LYMPHOCYTES RELATIVE PERCENT: 16.2 %
MCH RBC QN AUTO: 29 PG (ref 26–34)
MCHC RBC AUTO-ENTMCNC: 33.7 G/DL (ref 31–36)
MCV RBC AUTO: 86.1 FL (ref 80–100)
MICROSCOPIC EXAMINATION: YES
MONOCYTES ABSOLUTE: 0.6 K/UL (ref 0–1.3)
MONOCYTES RELATIVE PERCENT: 5.7 %
MUCUS: ABNORMAL /LPF
NEUTROPHILS ABSOLUTE: 7.7 K/UL (ref 1.7–7.7)
NEUTROPHILS RELATIVE PERCENT: 76.7 %
NITRITE, URINE: NEGATIVE
PDW BLD-RTO: 13 % (ref 12.4–15.4)
PH UA: 6 (ref 5–8)
PLATELET # BLD: 277 K/UL (ref 135–450)
PMV BLD AUTO: 8.1 FL (ref 5–10.5)
POTASSIUM REFLEX MAGNESIUM: 3.8 MMOL/L (ref 3.5–5.1)
PROTEIN UA: NEGATIVE MG/DL
RBC # BLD: 4.98 M/UL (ref 4–5.2)
RBC UA: ABNORMAL /HPF (ref 0–4)
SODIUM BLD-SCNC: 136 MMOL/L (ref 136–145)
SPECIFIC GRAVITY UA: 1.02 (ref 1–1.03)
TOTAL PROTEIN: 7.1 G/DL (ref 6.4–8.2)
URINE REFLEX TO CULTURE: ABNORMAL
URINE TYPE: ABNORMAL
UROBILINOGEN, URINE: 0.2 E.U./DL
WBC # BLD: 10 K/UL (ref 4–11)
WBC UA: ABNORMAL /HPF (ref 0–5)

## 2020-12-07 PROCEDURE — 80053 COMPREHEN METABOLIC PANEL: CPT

## 2020-12-07 PROCEDURE — 2580000003 HC RX 258: Performed by: NURSE PRACTITIONER

## 2020-12-07 PROCEDURE — 85025 COMPLETE CBC W/AUTO DIFF WBC: CPT

## 2020-12-07 PROCEDURE — 84703 CHORIONIC GONADOTROPIN ASSAY: CPT

## 2020-12-07 PROCEDURE — 93005 ELECTROCARDIOGRAM TRACING: CPT | Performed by: NURSE PRACTITIONER

## 2020-12-07 PROCEDURE — 99283 EMERGENCY DEPT VISIT LOW MDM: CPT

## 2020-12-07 PROCEDURE — 70450 CT HEAD/BRAIN W/O DYE: CPT

## 2020-12-07 PROCEDURE — 6370000000 HC RX 637 (ALT 250 FOR IP): Performed by: NURSE PRACTITIONER

## 2020-12-07 PROCEDURE — 81001 URINALYSIS AUTO W/SCOPE: CPT

## 2020-12-07 PROCEDURE — 97112 NEUROMUSCULAR REEDUCATION: CPT

## 2020-12-07 PROCEDURE — 97163 PT EVAL HIGH COMPLEX 45 MIN: CPT

## 2020-12-07 RX ORDER — MECLIZINE HYDROCHLORIDE CHEWABLE TABLETS 25 MG/1
25 TABLET, CHEWABLE ORAL ONCE
Status: COMPLETED | OUTPATIENT
Start: 2020-12-07 | End: 2020-12-07

## 2020-12-07 RX ORDER — 0.9 % SODIUM CHLORIDE 0.9 %
2000 INTRAVENOUS SOLUTION INTRAVENOUS ONCE
Status: COMPLETED | OUTPATIENT
Start: 2020-12-07 | End: 2020-12-07

## 2020-12-07 RX ORDER — ONDANSETRON 4 MG/1
4 TABLET, FILM COATED ORAL EVERY 8 HOURS PRN
Qty: 20 TABLET | Refills: 0 | Status: SHIPPED | OUTPATIENT
Start: 2020-12-07

## 2020-12-07 RX ORDER — DIAZEPAM 5 MG/1
5 TABLET ORAL ONCE
Status: COMPLETED | OUTPATIENT
Start: 2020-12-07 | End: 2020-12-07

## 2020-12-07 RX ADMIN — DIAZEPAM 5 MG: 5 TABLET ORAL at 19:48

## 2020-12-07 RX ADMIN — SODIUM CHLORIDE 2000 ML: 9 INJECTION, SOLUTION INTRAVENOUS at 17:21

## 2020-12-07 RX ADMIN — MECLIZINE HCL 25 MG: 25 TABLET, CHEWABLE ORAL at 19:47

## 2020-12-07 ASSESSMENT — ENCOUNTER SYMPTOMS
SHORTNESS OF BREATH: 0
SORE THROAT: 0
RHINORRHEA: 0
ABDOMINAL PAIN: 0
COLOR CHANGE: 0

## 2020-12-07 NOTE — PROGRESS NOTES
Outpatient Physical Therapy  Hospital Phone: [phone], Hospital Fax: [phone]       To:      Emergency Dept Team      From: [facility], PT, RACHEAL, XAVIERc      Patient: Kirill Dominguez        : 1970  Diagnosis:  Dizziness, Nausea, Difficulty ambulating   Date: 2020      Physical Therapy  Note                                        Significant Findings :   · Pt presents for PT this date with c/o sudden onset room spinning vertigo and severe nausea with (+) emesis. The symptoms began yesterday am.  Pt is well known to PT with significant history of BPPV, which in the past has responded well to repositioning techniques. · Full positional assessment for BPPV demonstrated NEGATIVE reproduction of symptoms in all planes , except Downbeating nystagmus lasting approx 25 seconds in duration with LEFT Roll Test.    Downbeating nystagmus in this canal is not consistent with typical BPPV and could indicate possible central etiology. Additional workup is indicated. · Occulomotor assessment negative for peripheral vestibular hypofunction. · BP in sitting 159/100  · Significant nausea noted   · Pt unable to tolerate ambulation without assistance due to significant stagger. Patient Status:  Referred to ED for Medical workup. Pt agreeable. Pt may benefit from OP referral to ENT for VNG exam, or neuro follow up - pending results of ED assessment. Thank you for your assistance with this patient! Electronically signed by:  [facility], PT, RACHEAL, MANJINDER-c [id number]    If you have any questions or concerns, please don't hesitate to call.

## 2020-12-07 NOTE — ED PROVIDER NOTES
[address] ED  EMERGENCY DEPARTMENT ENCOUNTER        Pt Name: Derek Duffy  MRN: [id number]  [place] 1970  Date of evaluation: 12/7/2020  Provider: JAMEL Jose CNP  PCP: Mervat Selby  ED Attending: No att. providers found    [address] [facility]       Chief Complaint   Patient presents with    Dizziness     history of dizzy       HISTORY OF PRESENT ILLNESS   (Location/Symptom, Timing/Onset, Context/Setting, Quality, Duration, Modifying Factors, Severity)  Note limiting factors. Derek Duffy is a 53 y.o. female for dizziness. Onset was yesterday. Context includes pt states she has a history of BPPV. Pt states she went to PT today in an attempt to relieve her symptoms and the PT staff did not think this were BPPV symptoms and sent pt to the er. Pt states she has had this before and it is a similar resentation. She denies any fever. She does report her sense of taste, smell and hearing are heightened. Alleviating factors include nothing. Aggravating factors include nothing. Pain is 0/10. nothing has been used for pain today. Nursing Notes were all reviewed and agreed with or any disagreements were addressed  in the HPI. REVIEW OF SYSTEMS  (2-9 systems for level 4, 10 or more for level 5)     Review of Systems   Constitutional: Negative for fever. HENT: Negative for congestion, rhinorrhea and sore throat. Respiratory: Negative for shortness of breath. Cardiovascular: Negative for chest pain. Gastrointestinal: Negative for abdominal pain. Genitourinary: Negative for decreased urine volume and difficulty urinating. Musculoskeletal: Negative for arthralgias and myalgias. Skin: Negative for color change and rash. Neurological: Positive for dizziness. Negative for light-headedness. Psychiatric/Behavioral: Negative for agitation. All other systems reviewed and are negative. Positivesand Pertinent negatives as per HPI.   Except as noted above in the ROS, all other systems were reviewed and negative. PAST MEDICAL HISTORY     Past Medical History:   Diagnosis Date    Anxiety     Depression     Dizziness     Thyroid disease          SURGICAL HISTORY       Past Surgical History:   Procedure Laterality Date    BREAST ENHANCEMENT SURGERY      CHOLECYSTECTOMY      COLONOSCOPY      DILATION AND CURETTAGE OF UTERUS      FACIAL RECONSTRUCTION SURGERY  1999    dog attack    TUBAL LIGATION      WISDOM TOOTH EXTRACTION           CURRENT MEDICATIONS       Previous Medications    LEVOTHYROXINE (SYNTHROID) 50 MCG TABLET    Take 1 tablet by mouth daily    LORAZEPAM (ATIVAN) 0.5 MG TABLET    Take 0.5 mg by mouth as needed. ALLERGIES     Codeine; Compazine [prochlorperazine maleate]; Doxycycline; Percocet [oxycodone-acetaminophen]; and Vicodin [hydrocodone-acetaminophen]    FAMILY HISTORY     History reviewed. No pertinent family history.       SOCIAL HISTORY       Social History     Socioeconomic History    Marital status:      Spouse name: None    Number of children: None    Years of education: None    Highest education level: None   Occupational History    None   Social Needs    Financial resource strain: None    Food insecurity     Worry: None     Inability: None    Transportation needs     Medical: None     Non-medical: None   Tobacco Use    Smoking status: Never Smoker    Smokeless tobacco: Never Used   Substance and Sexual Activity    Alcohol use: No    Drug use: No    Sexual activity: None   Lifestyle    Physical activity     Days per week: None     Minutes per session: None    Stress: None   Relationships    Social connections     Talks on phone: None     Gets together: None     Attends Christianity service: None     Active member of club or organization: None     Attends meetings of clubs or organizations: None     Relationship status: None    Intimate partner violence     Fear of current or ex partner: None Emotionally abused: None     Physically abused: None     Forced sexual activity: None   Other Topics Concern    None   Social History Narrative    None       SCREENINGS   NIH Stroke Scale  Interval: Baseline  Level of Consciousness (1a. ): Alert  LOC Questions (1b. ): Answers both correctly  LOC Commands (1c. ): Performs both tasks correctly  Best Gaze (2. ): Normal  Visual (3. ): No visual loss  Facial Palsy (4. ): Normal symmetrical movement  Motor Arm, Left (5a. ): No drift  Motor Arm, Right (5b. ): No drift  Motor Leg, Left (6a. ): No drift  Motor Leg, Right (6b. ): No drift  Limb Ataxia (7. ): Absent  Sensory (8. ): Normal  Best Language (9. ): No aphasia  Dysarthria (10. ): Normal         PHYSICAL EXAM    (up to 7 for level 4, 8 ormore for level 5)     ED Triage Vitals [12/07/20 1636]   BP Temp Temp Source Pulse Resp SpO2 Height Weight   135/86 97.6 °F (36.4 °C) Temporal 69 16 99 % 5' 5\" (1.651 m) 205 lb (93 kg)       Physical Exam  Constitutional:       Appearance: She is well-developed. HENT:      Head: Normocephalic and atraumatic. Eyes:      Extraocular Movements: Extraocular movements intact. Pupils: Pupils are equal, round, and reactive to light. Neck:      Musculoskeletal: Normal range of motion. Cardiovascular:      Rate and Rhythm: Normal rate. Pulmonary:      Effort: Pulmonary effort is normal. No respiratory distress. Abdominal:      General: There is no distension. Palpations: Abdomen is soft. Tenderness: There is no abdominal tenderness. Musculoskeletal: Normal range of motion. Skin:     General: Skin is warm and dry. Neurological:      General: No focal deficit present. Mental Status: She is alert and oriented to person, place, and time. Cranial Nerves: No cranial nerve deficit. Sensory: No sensory deficit.          DIAGNOSTIC RESULTS   LABS:    Labs Reviewed   URINE RT REFLEX TO CULTURE - Abnormal; Notable for the following components: Result Value    Ketones, Urine 15 (*)     Blood, Urine TRACE-INTACT (*)     All other components within normal limits    Narrative:     Performed at:  [place] [address],  [place], [facility]   Phone [phone]   MICROSCOPIC URINALYSIS - Abnormal; Notable for the following components:    Mucus, UA 3+ (*)     RBC, UA 5-10 (*)     Epithelial Cells, UA 21-50 (*)     Bacteria, UA Rare (*)     All other components within normal limits    Narrative:     Performed at:  [place] [address],  [place], [facility]   Phone [phone]   CBC WITH AUTO DIFFERENTIAL    Narrative:     Performed at:  [facility]) [place] [address],  [place], [facility]   Phone [phone]   COMPREHENSIVE METABOLIC PANEL W/ REFLEX TO MG FOR LOW K    Narrative:     Performed at:  [place] [address],  [employer], [facility]   Phone [phone]   HCG, SERUM, QUALITATIVE    Narrative:     Performed at:  [place] [address],  [place], [facility]   Phone [phone]       All other labs were within normal range or not returned as of this dictation. EKG: All EKG's are interpreted by the Emergency Department Physician who either signs or Co-signs this chart in the absence of a cardiologist.  Please see their note for interpretation of EKG. RADIOLOGY:   CT head without contrast interpreted by radiologist for    FINDINGS:   BRAIN/VENTRICLES: There is no acute intracranial hemorrhage, mass effect or   midline shift.  No abnormal extra-axial fluid collection.  The gray-white   differentiation is maintained without evidence of an acute infarct.  There is   no evidence of hydrocephalus. ORBITS: The visualized portion of the orbits demonstrate no acute abnormality.      SINUSES: The visualized paranasal sinuses and mastoid air cells demonstrate   no acute abnormality. SOFT TISSUES/SKULL:  No acute abnormality of the visualized skull or soft   tissues. Interpretation per the Radiologist below, if available at the time of this note:    CT HEAD WO CONTRAST   Final Result   No acute intracranial abnormality. No results found. PROCEDURES   Unless otherwise noted below, none     Procedures    CRITICAL CARE TIME   N/A    CONSULTS:  None      EMERGENCY DEPARTMENT COURSE and DIFFERENTIAL DIAGNOSIS/MDM:   Vitals:    Vitals:    12/07/20 1636   BP: 135/86   Pulse: 69   Resp: 16   Temp: 97.6 °F (36.4 °C)   TempSrc: Temporal   SpO2: 99%   Weight: 205 lb (93 kg)   Height: 5' 5\" (1.651 m)       Patient was given the following medications:  Medications   0.9 % sodium chloride bolus (0 mLs Intravenous Stopped 12/7/20 1918)   meclizine (ANTIVERT) chewable tablet 25 mg (25 mg Oral Given 12/7/20 1947)   diazePAM (VALIUM) tablet 5 mg (5 mg Oral Given 12/7/20 1948)       She was seen and evaluated by myself and Dr. Christa Horton. Patient here for complaints of dizziness. Patient reports that she woke up yesterday with dizziness. She does have a history of BPPV. Patient states that she does not use physical therapy which helps her symptoms and went to physical therapy today. Patient reports that physical therapist was concerned that this may not be her BPPV and sent her to the ED. On exam the patient is awake alert hemodynamically stable nontoxic in appearance. Patient reports that these are her normal symptoms. Patient states that she has had increased in her senses including increased taste hearing and smell. She states that the light does bother her and she is more nauseated but these are her typical complaints. Patient is neurologically intact and has a 0 NIH score. She was orthostatically hypotensive. She was provided with IV fluids in the ED. She was given medications for the dizziness.   On reevaluation the patient states that her were completed with a voice recognition program.  Efforts were made to edit the dictations but occasionally words are mis-transcribed.)    JAMEL Manzano CNP (electronically signed)       JAMEL Manzano CNP  12/07/20 2026

## 2020-12-07 NOTE — PROGRESS NOTES
[x]NO    []Pressure/fullness   [x]NO  []Pain      [x]NO    Does pt have chronic hearing loss? []Yes     [x]No    Does pt wear hearing aids? []Yes     [x]No    Any recent illness or infections? []Yes     [x]No    Any recent accidents or head trauma? []Yes     [x]No    Any history of migraines or headaches? []Yes     [x]No      Report of Falls or near Falls:     Any falls to the ground? []Yes     [x]No  Any near falls? [x]Yes     []No  Does pt complain of stumble, stagger, or side step while walking? [x]Yes     []No  Does pt complain of drift to one side while walking? [x]Yes     []No    Visual Field Limitations :    Does pt wear glasses/contacts?      [x]Yes     []No  []Reading     []Distance     []Bifocals /Trifocals     []Prisms   Recent Change in Vision associated with symptoms:  []Yes :    [x]No        OBJECTIVE FINDINGS    Blood Pressure (if possible orthostatic HTN):  [x]Not tested  Seated:159/100    Cervical AROM:    []WNL     [x]WFL     []Impaired     []Painful    Vertebral Artery test in sitting position:    []Not tested  [x]Negative  []Positive       Oculomotor Examination:    In Room Light:   Spontaneous Nystagmus:   [x]No       Gaze Holding Nystagmus:   [x]No      Eye Movement Range:   [x]Conjugate       Vergence:        [x]WNL       Smooth Pursuits:      [x]WNL           Saccades:        [x]WNL (2 or less)       VOR (Cancellation):   [x]WNL       VOR (Slow):       [x]WNL       Head Impulse Test/ Head Thrust Test:         [x]Negative              Positional Testing:     Right Pineland Hallpike:    [x]Negative     []Vertigo     []Nystagmus     Describe:   Left Ximena Hallpike:      [x]Negative     []Vertigo     []Nystagmus     Describe:   Right Roll test:      [x]Negative     []Vertigo     []Nystagmus     Describe:  Left Roll test:      []Negative     []Vertigo     [x]Nystagmus     Describe: anatomically down beating  Head Center Test:   [x]Negative     []Vertigo []Nystagmus     Describe:     Coordination Testing:    Not formally tested  Finger to Nose:        []WNL     []Impaired  Alternating pronation/supination:  []WNL     []Impaired  Heel to shin (sitting or supine):      []WNL     []Impaired  Toe Tapping:        []WNL     []Impaired    Balance  Pt utilizing w/c to enter facility. Gait Testing:     Level of Assistance needed:     [x]MAX A        Strength/ROM Testing      [x]As indicated below                                                     Movement Left Right Movement Left Right Comments   Shoulder Flexion 5 5 Hip Flexion 5 5    Shoulder Extension   Hip Extension      Shoulder Abduction   Hip Abduction      Shoulder IR   Hip Adduction      Shoulder ER   Hip Internal Rotation      Elbow Flexion  5 5 Hip External Rotation      Elbow Extension 5 5 Knee Flexion 5 5    Supination   Knee Extension 5 5    Pronation   Ankle Dorsiflexion 5 5    Wrist Flexion   Ankle Plantarflexion      Wrist Extension   Ankle Inversion      Hand    5 5 Ankle Eversion              ASSESSMENT  : Pt is  53 Y. O  fe/male, presenting with c/o  Sudden onset of dizziness. Assessment reveals positional testing that DOES provoke downbeating nystagmus with the Left Horizontal Canal Roll test, but that is NOT CONSISTENT with typical canalithiasis BPPV. Tests for vestibular hypofunction were negative at this time as well. Due to the atypical results of the PT assessment, In addition to an elevated BP of 159/100, Pt was referred directly to the ED for further workup. Pt may benefit from referral to Neuro and / or ENT for VNG, prior to returning for any additional therapy. Rehabilitation Potential:  Good for goals listed below. Strengths for achieving goals include:   [x]Pt motivated   Limitations for achieving goals include:     [x]Severity of condition       Prognosis: [x]Good   []Fair   []Poor      [facility] with referral to ED for additional workup.           Timed Code Treatment Minutes:   25  minutes   Total Treatment Time:  45  minutes    Plan of care sent to provider:      [x]Faxed  []Co-signature    (attempts: 1[x] 2 []3[])             Gari Goltz PT, MPT, OMT-c [id number]

## 2020-12-08 LAB
EKG ATRIAL RATE: 69 BPM
EKG DIAGNOSIS: NORMAL
EKG P AXIS: 34 DEGREES
EKG P-R INTERVAL: 130 MS
EKG Q-T INTERVAL: 398 MS
EKG QRS DURATION: 86 MS
EKG QTC CALCULATION (BAZETT): 426 MS
EKG R AXIS: 62 DEGREES
EKG T AXIS: 192 DEGREES
EKG VENTRICULAR RATE: 69 BPM

## 2020-12-08 PROCEDURE — 93010 ELECTROCARDIOGRAM REPORT: CPT | Performed by: INTERNAL MEDICINE

## 2020-12-08 NOTE — ED PROVIDER NOTES
I independently performed a history and physical on [employer]. All diagnostic, treatment, and disposition decisions were made by myself in conjunction with the advanced practice provider. I have participated in the medical decision making and directed the treatment plan and disposition of the patient. For further details of [facility] emergency department encounter, please see the advanced practice provider's documentation. CHIEF COMPLAINT  Chief Complaint   Patient presents with    Dizziness     history of dizzy       Briefly, Latoya Enamorado is a 53 y.o. female  who presents to the ED complaining of vertigo for which she takes Zofran and Ativan as needed here today for dizziness. The patient states that she woke up with both vertiginous symptoms. States she felt very dizzy. It is worse if she sits up or turns her head to the left. Improved with laying on the right side. She states she has seen her vestibular therapist earlier today who was concerned that this may not be her usual vertiginous symptoms. Patient states that she feels very similar to multiple prior episodes. Denies headache. No numbness, tingling or weakness in extremities. No recent URI symptoms. No ringing in her ears. Mild nausea with significant vomiting. No recent head injury or trauma. FOCUSED PHYSICAL EXAMINATION  /82   Pulse 78   Temp 97.6 °F (36.4 °C) (Temporal)   Resp 16   Ht 5' 5\" (1.651 m)   Wt 205 lb (93 kg)   LMP 11/24/2020   SpO2 96%   BMI 34.11 kg/m²      Focused physical examination:  General appearance:  Cooperative. No acute distress. Skin:  Warm. Dry. Eye:  Extraocular movements intact. Pupils are equally round and reactive to light and accommodation. Extraocular motions are intact. CN II-XII intact. Ears, nose, mouth and throat:  Oral mucosa moist, tympanic membranes clear bilaterally  Neck:  Trachea midline.    Heart:  Regular rate and rhythm  Perfusion: accuracy, however some errors may be present due to limitations of this technology.             Junior Recio MD  12/09/20 2015

## 2021-05-05 ENCOUNTER — HOSPITAL ENCOUNTER (OUTPATIENT)
Dept: PHYSICAL THERAPY | Age: 51
Setting detail: THERAPIES SERIES
Discharge: HOME | End: 2021-05-05
Payer: COMMERCIAL

## 2021-08-08 ENCOUNTER — HOSPITAL ENCOUNTER (EMERGENCY)
Age: 51
Discharge: HOME | End: 2021-08-09
Attending: EMERGENCY MEDICINE
Payer: COMMERCIAL

## 2021-08-08 DIAGNOSIS — H81.399 PERIPHERAL VERTIGO, UNSPECIFIED LATERALITY: Primary | ICD-10-CM

## 2021-08-08 LAB
A/G RATIO: 1.6 (ref 1.1–2.2)
ALBUMIN SERPL-MCNC: 4.4 G/DL (ref 3.4–5)
ALP BLD-CCNC: 53 U/L (ref 40–129)
ALT SERPL-CCNC: 13 U/L (ref 10–40)
ANION GAP SERPL CALCULATED.3IONS-SCNC: 9 MMOL/L (ref 3–16)
AST SERPL-CCNC: 16 U/L (ref 15–37)
BASOPHILS ABSOLUTE: 0.1 K/UL (ref 0–0.2)
BASOPHILS RELATIVE PERCENT: 1.3 %
BILIRUB SERPL-MCNC: 0.7 MG/DL (ref 0–1)
BUN BLDV-MCNC: 7 MG/DL (ref 7–20)
CALCIUM SERPL-MCNC: 8.9 MG/DL (ref 8.3–10.6)
CHLORIDE BLD-SCNC: 106 MMOL/L (ref 99–110)
CO2: 25 MMOL/L (ref 21–32)
CREAT SERPL-MCNC: 0.7 MG/DL (ref 0.6–1.1)
EOSINOPHILS ABSOLUTE: 0 K/UL (ref 0–0.6)
EOSINOPHILS RELATIVE PERCENT: 0.3 %
GFR AFRICAN AMERICAN: >60
GFR NON-AFRICAN AMERICAN: >60
GLOBULIN: 2.7 G/DL
GLUCOSE BLD-MCNC: 128 MG/DL (ref 70–99)
HCG QUALITATIVE: NEGATIVE
HCT VFR BLD CALC: 42.3 % (ref 36–48)
HEMOGLOBIN: 14.5 G/DL (ref 12–16)
LYMPHOCYTES ABSOLUTE: 1.1 K/UL (ref 1–5.1)
LYMPHOCYTES RELATIVE PERCENT: 12.7 %
MCH RBC QN AUTO: 29.1 PG (ref 26–34)
MCHC RBC AUTO-ENTMCNC: 34.3 G/DL (ref 31–36)
MCV RBC AUTO: 84.9 FL (ref 80–100)
MONOCYTES ABSOLUTE: 0.4 K/UL (ref 0–1.3)
MONOCYTES RELATIVE PERCENT: 4.5 %
NEUTROPHILS ABSOLUTE: 7.2 K/UL (ref 1.7–7.7)
NEUTROPHILS RELATIVE PERCENT: 81.2 %
PDW BLD-RTO: 13.3 % (ref 12.4–15.4)
PLATELET # BLD: 281 K/UL (ref 135–450)
PMV BLD AUTO: 8.1 FL (ref 5–10.5)
POTASSIUM REFLEX MAGNESIUM: 4 MMOL/L (ref 3.5–5.1)
RBC # BLD: 4.98 M/UL (ref 4–5.2)
SODIUM BLD-SCNC: 140 MMOL/L (ref 136–145)
TOTAL PROTEIN: 7.1 G/DL (ref 6.4–8.2)
TROPONIN: <0.01 NG/ML
WBC # BLD: 8.8 K/UL (ref 4–11)

## 2021-08-08 PROCEDURE — 99284 EMERGENCY DEPT VISIT MOD MDM: CPT

## 2021-08-08 PROCEDURE — 84484 ASSAY OF TROPONIN QUANT: CPT

## 2021-08-08 PROCEDURE — 93005 ELECTROCARDIOGRAM TRACING: CPT | Performed by: EMERGENCY MEDICINE

## 2021-08-08 PROCEDURE — 80053 COMPREHEN METABOLIC PANEL: CPT

## 2021-08-08 PROCEDURE — 84703 CHORIONIC GONADOTROPIN ASSAY: CPT

## 2021-08-08 PROCEDURE — 85025 COMPLETE CBC W/AUTO DIFF WBC: CPT

## 2021-08-08 RX ORDER — 0.9 % SODIUM CHLORIDE 0.9 %
1000 INTRAVENOUS SOLUTION INTRAVENOUS ONCE
Status: COMPLETED | OUTPATIENT
Start: 2021-08-09 | End: 2021-08-09

## 2021-08-08 RX ORDER — DIAZEPAM 5 MG/1
5 TABLET ORAL ONCE
Status: COMPLETED | OUTPATIENT
Start: 2021-08-09 | End: 2021-08-09

## 2021-08-09 VITALS
HEIGHT: 66 IN | SYSTOLIC BLOOD PRESSURE: 125 MMHG | OXYGEN SATURATION: 96 % | BODY MASS INDEX: 32.95 KG/M2 | WEIGHT: 205 LBS | DIASTOLIC BLOOD PRESSURE: 87 MMHG | TEMPERATURE: 97.2 F | HEART RATE: 73 BPM | RESPIRATION RATE: 16 BRPM

## 2021-08-09 LAB
EKG ATRIAL RATE: 67 BPM
EKG DIAGNOSIS: NORMAL
EKG P AXIS: 38 DEGREES
EKG P-R INTERVAL: 134 MS
EKG Q-T INTERVAL: 406 MS
EKG QRS DURATION: 86 MS
EKG QTC CALCULATION (BAZETT): 429 MS
EKG R AXIS: 56 DEGREES
EKG T AXIS: 218 DEGREES
EKG VENTRICULAR RATE: 67 BPM

## 2021-08-09 PROCEDURE — 95992 CANALITH REPOSITIONING PROC: CPT

## 2021-08-09 PROCEDURE — 93010 ELECTROCARDIOGRAM REPORT: CPT | Performed by: INTERNAL MEDICINE

## 2021-08-09 PROCEDURE — 97162 PT EVAL MOD COMPLEX 30 MIN: CPT

## 2021-08-09 PROCEDURE — 6370000000 HC RX 637 (ALT 250 FOR IP): Performed by: EMERGENCY MEDICINE

## 2021-08-09 PROCEDURE — 2580000003 HC RX 258: Performed by: EMERGENCY MEDICINE

## 2021-08-09 RX ORDER — 0.9 % SODIUM CHLORIDE 0.9 %
1000 INTRAVENOUS SOLUTION INTRAVENOUS ONCE
Status: COMPLETED | OUTPATIENT
Start: 2021-08-09 | End: 2021-08-09

## 2021-08-09 RX ORDER — DIPHENHYDRAMINE HCL 25 MG
50 TABLET ORAL ONCE
Status: COMPLETED | OUTPATIENT
Start: 2021-08-09 | End: 2021-08-09

## 2021-08-09 RX ORDER — MECLIZINE HYDROCHLORIDE CHEWABLE TABLETS 25 MG/1
25 TABLET, CHEWABLE ORAL ONCE
Status: COMPLETED | OUTPATIENT
Start: 2021-08-09 | End: 2021-08-09

## 2021-08-09 RX ADMIN — MECLIZINE HCL 25 MG: 25 TABLET, CHEWABLE ORAL at 01:48

## 2021-08-09 RX ADMIN — SODIUM CHLORIDE 1000 ML: 9 INJECTION, SOLUTION INTRAVENOUS at 00:20

## 2021-08-09 RX ADMIN — SODIUM CHLORIDE 1000 ML: 9 INJECTION, SOLUTION INTRAVENOUS at 01:48

## 2021-08-09 RX ADMIN — DIPHENHYDRAMINE HCL 50 MG: 25 TABLET ORAL at 03:29

## 2021-08-09 RX ADMIN — DIAZEPAM 5 MG: 5 TABLET ORAL at 00:20

## 2021-08-09 NOTE — PROGRESS NOTES
Inpatient Physical Therapy Evaluation and Treatment    Unit: ED  Date:  8/9/2021  Patient Name:    Stone Posadas  Admitting diagnosis:  No admission diagnoses are documented for this encounter. Admit Date:  8/8/2021  Precautions/Restrictions/WB Status/ Lines/ Wounds/ Oxygen: Fall risk    Treatment Time:  0800-0930  Treatment Number:  1   Timed Code Treatment Minutes: 80 minutes  Total Treatment Minutes:  90  minutes    Patient Goals for Therapy: \" to treat my BPPV\"          Discharge Recommendations: Home PRN assist  and outpatient vestibular PT  DME needs for discharge: Needs Met       Therapy recommendation for EMS Transport: can transport by wheelchair    Therapy recommendations for staff:   Supervision with use of No AD for all transfers and ambulation within room  within halls    History of Present Illness: Per H&P Ghazal 8/08: Luis Fulton is a 49 y.o. female with a past medical history of BPPV who presents to the ED complaining of dizziness. The patient states that this started late afternoon today. She states it is worse when she sits or stands. She states that she slept on her left side last night and this could have caused her symptoms to start. She states it feels like her prior episodes of BPPV. She describes nausea, had multiple episodes of emesis earlier today. She took Zofran and meclizine prior to arrival.  She does not have a headache, but is photophobic which is normal for her BPPV. She denies chest pain or shortness of breath. Denies any abdominal pain. Denies focal numbness or weakness, or speech changes. No other complaints, modifying factors or associated symptoms\"  Has been seen for BPPV at [address] on a number of occasions: 7/11/20, 10/24/19 and  3/12/20.   Most recently, on 12/6/20 she was seen at [facility] OP clinic and sent directly to ED for high BP plus atypical results from left horizontal canal roll test. Pt reports she was evaluated at ED and sent home with no further followup and symptoms self resolved in ~10 days. Home Health S4 Level Recommendation:  NA  AM-PAC Mobility Score    AM-PAC Inpatient Mobility Raw Score : 23       Preadmission Environment    Pt. Lives with spouse  Home environment:  one story home  Steps to enter first floor: 2 steps to enter and hand rail unilateral  Steps to second floor: N/A  Bathroom: tub/shower unit  Equipment owned: none    Preadmission Status:  Pt. Able to drive: Yes  Pt Fully independent with ADLs: Yes  Pt. Required assistance from family for: Independent PTA  Pt. independent for transfers and gait and walked with No Device  History of falls No    Pain   No - pt reports dizziness at rest and made worse with change of position, most notably when sitting up or looking up. Cognition    A&O x4   Able to follow 2 step commands    Subjective  Patient lying in R sidelying with no family present. Lights out. Pt agreeable to this PT eval & tx. Reports symptoms started while driving home yesterday from a party. She was looking down for quite a while then suddenly looked up and symptoms started. Last night she tried to lie on L side to make it better (typically lies on back or R side), but this made it worse. Decided to come to ED at that point. Upper Extremity ROM/Strength  [facility]. Lower Extremity ROM / Strength   AROM WFL: Yes    BLE strength WFL, but not formally assessed with MMT. Lower Extremity Sensation    NT    Lower Extremity Proprioception:   NT    Coordination and Tone  NT    Balance  Sitting:  Good ; Supervision  Comments: dizzy at EOB once sitting after positional tests. Standing: Good ; Supervision  Comments: without AD. Pt is guarded, moving slow and deliberately, with minimal head/trunk rotation.      Bed Mobility   Supine to Sit:    Min A  (assisted to sitting position at end of Epley maneuver)  Sit to Supine:   Not Tested  Rolling:   Supervision  Scooting in sitting: Independent  Scooting in supine:  Supervision    Transfer Training     Sit to stand:   Supervision  Stand to sit:   Supervision  Bed to Chair:   Not Tested with use of N/A    Gait gait completed as indicated below  Distance:      40 ft + 40 ft   Deviations (firm surface/linoleum):  decreased david and decreased step length bilaterally and decreased arm swing bilaterally (generally cautious/guarded presentation)  Assistive Device Used:    No AD  Level of Assist:    Supervision  Comment: Steady, no LOB. Stair Training deferred, pt unsafe/ not appropriate to complete stairs at this time    Activity Tolerance   Pt completed therapy session with Dizziness noted with activity. See notes in \"Other\" section below. Resting BP in long sitting before treatment: 146/105. Positioning Needs   Pt in bed, no alarm needed, positioned in proper neutral alignment and pressure relief provided. Call light provided and all needs within reach   Spoke with RN upon completion. Other  Vestibular evaluation:   Ximena-Hallpike R side: Neg nystagmus, +symptomatic  Gratis-Hallpike L side: Neg nystagmus, +symptomatic   Pt reports symptoms greater to R than L. Horizontal roll R side: Neg nystagmus, +symptomatic   Horizontal roll L side: Neg nystagmus, +symptomatic   Pt reports worst symptoms with D-H to R side. Repeat D-H to R side: + nystagmus last 5 seconds of position 1   Treated with Epley to R side. Pt reports continuation of dizziness after treatment but tolerable for mobility. Patient/Family Education   Pt educated on role of inpatient PT, POC,safety awareness and calling for assist with mobility. Encouraged pt to follow up with OP evaluation after d/c. Assessment  Pt seen for Physical Therapy evaluation in acute care setting. Pt with extensive history of vestibular symptoms in multiple canals and positional treatment is typically successful.  Today she is negative for all positional tests but symptomatic at rest and made worse with positional

## 2021-08-09 NOTE — ED NOTES
Pt resting in bed with eye closed and lights dimmed. Pt's friend at bedside. Denies any needs at this time.       Chico Stuart RN  08/09/21 [id number]

## 2021-08-09 NOTE — ED NOTES
Pt resting in bed with eyes closed. Lights dimmed. Call light in reach.       Daniela Loco RN  08/09/21 2498

## 2021-08-09 NOTE — ED NOTES
Pt requesting to speak with Preston ALLEN. DO Preston notified.       Simeon Rodrigues RN  08/09/21 5022

## 2021-08-09 NOTE — ED PROVIDER NOTES
Emergency Department Encounter  Location: [place] [address] ED    Patient: Chandler Cerna  MRN: [id number]  : 1970  Date of evaluation: 2021  ED Provider: Milton Roman MD    Chandler Cerna was checked out to me by Dr. Ana Smith. Please see his/her initial documentation for details of the patient's initial ED presentation, physical exam and completed studies. In brief, Chandler Cerna is a 49 y.o. female that presented to the emergency department dizziness. Patient had been evaluated and was pending physical therapy evaluation which she was signed out to me. Patient was evaluated by physical therapy. They recommended the patient was stable enough to be discharged home no indication for admission.   Findings discussed with patient she was discharged home back to her therapist.    I have reviewed and interpreted all of the currently available lab results and diagnostics from this visit:  Results for orders placed or performed during the hospital encounter of 21   CBC Auto Differential   Result Value Ref Range    WBC 8.8 4.0 - 11.0 K/uL    RBC 4.98 4.00 - 5.20 M/uL    Hemoglobin 14.5 12.0 - 16.0 g/dL    Hematocrit 42.3 36.0 - 48.0 %    MCV 84.9 80.0 - 100.0 fL    MCH 29.1 26.0 - 34.0 pg    MCHC 34.3 31.0 - 36.0 g/dL    RDW 13.3 12.4 - 15.4 %    Platelets 219 904 - 318 K/uL    MPV 8.1 5.0 - 10.5 fL    Neutrophils % 81.2 %    Lymphocytes % 12.7 %    Monocytes % 4.5 %    Eosinophils % 0.3 %    Basophils % 1.3 %    Neutrophils Absolute 7.2 1.7 - 7.7 K/uL    Lymphocytes Absolute 1.1 1.0 - 5.1 K/uL    Monocytes Absolute 0.4 0.0 - 1.3 K/uL    Eosinophils Absolute 0.0 0.0 - 0.6 K/uL    Basophils Absolute 0.1 0.0 - 0.2 K/uL   Comprehensive Metabolic Panel w/ Reflex to MG   Result Value Ref Range    Sodium 140 136 - 145 mmol/L    Potassium reflex Magnesium 4.0 3.5 - 5.1 mmol/L    Chloride 106 99 - 110 mmol/L    CO2 25 21 - 32 mmol/L    Anion Gap 9 3 - 16    Glucose 128 (H) 70 - 99 mg/dL BUN 7 7 - 20 mg/dL    CREATININE 0.7 0.6 - 1.1 mg/dL    GFR Non-African American >60 >60    GFR African American >60 >60    Calcium 8.9 8.3 - 10.6 mg/dL    Total Protein 7.1 6.4 - 8.2 g/dL    Albumin 4.4 3.4 - 5.0 g/dL    Albumin/Globulin Ratio 1.6 1.1 - 2.2    Total Bilirubin 0.7 0.0 - 1.0 mg/dL    Alkaline Phosphatase 53 40 - 129 U/L    ALT 13 10 - 40 U/L    AST 16 15 - 37 U/L    Globulin 2.7 g/dL   hCG, serum, qualitative   Result Value Ref Range    hCG Qual Negative Detects HCG level >10 MIU/mL   Troponin   Result Value Ref Range    Troponin <0.01 <0.01 ng/mL   EKG 12 Lead   Result Value Ref Range    Ventricular Rate 67 BPM    Atrial Rate 67 BPM    P-R Interval 134 ms    QRS Duration 86 ms    Q-T Interval 406 ms    QTc Calculation (Bazett) 429 ms    P Axis 38 degrees    R Axis 56 degrees    T Axis 218 degrees    Diagnosis       Normal sinus rhythmNonspecific T wave abnormalityAbnormal ECGNo significant change was foundWhen compared with ECG of12.7.20Confirmed by ADELA SAHNI, MAXWELL ([id number]) on 8/9/2021 7:31:17 AM     No results found.     ED Medication Orders (From admission, onward)    Start Ordered     Status Ordering Provider    08/09/21 0330 08/09/21 0327  diphenhydrAMINE (BENADRYL) tablet 50 mg  ONCE      Last MAR action: Given - by Aliyah Saavedra on 08/09/21 at [address] , [facility]    08/09/21 0145 08/09/21 0136  0.9 % sodium chloride bolus  ONCE      Last MAR action: Stopped - by Aliyah Saavedra on 08/09/21 at [address] [facility], [facility]    08/09/21 0145 08/09/21 0136  meclizine (ANTIVERT) chewable tablet 25 mg  ONCE      Last MAR action: Given - by Aliyah Saavedra on 08/09/21 at [facility], [facility]    08/09/21 0000 08/08/21 2351  0.9 % sodium chloride bolus  ONCE      Last MAR action: Stopped - by Aliyah Saavedra on 08/09/21 at [address] [facility], [place]    08/09/21 0000 08/08/21 2351  diazePAM (VALIUM) tablet 5 mg  ONCE      Last MAR action: Given - by Aliyah Saavedra on 08/09/21 at [facility]      1.  Peripheral vertigo, unspecified laterality        DISPOSITION Decision To Discharge 08/09/2021 03:06:56 AM     (Please note that portions of this note may have been completed with a voice recognition program. Efforts were made to edit the dictations but occasionally words are mis-transcribed.)    MD Leonardo Lopez MD  08/09/21 1304

## 2021-08-09 NOTE — ED PROVIDER NOTES
[address] ED      CHIEF COMPLAINT  Dizziness (Pt states she has BPPV and dizziness started this afternoon. Last vomited a few hours ago. )       HISTORY OF PRESENT ILLNESS  Michael Law is a 49 y.o. female with a past medical history of BPPV who presents to the ED complaining of dizziness. The patient states that this started late afternoon today. She states it is worse when she sits or stands. She states that she slept on her left side last night and this could have caused her symptoms to start. She states it feels like her prior episodes of BPPV. She describes nausea, had multiple episodes of emesis earlier today. She took Zofran and meclizine prior to arrival.  She does not have a headache, but is photophobic which is normal for her BPPV. She denies chest pain or shortness of breath. Denies any abdominal pain. Denies focal numbness or weakness, or speech changes. No other complaints, modifying factors or associated symptoms. I have reviewed the following from the nursing documentation. Past Medical History:   Diagnosis Date    Anxiety     Depression     Dizziness     Mitral valve prolapse     Thyroid disease      Past Surgical History:   Procedure Laterality Date    BREAST ENHANCEMENT SURGERY      CHOLECYSTECTOMY      COLONOSCOPY      DILATION AND CURETTAGE OF UTERUS      FACIAL RECONSTRUCTION SURGERY  1999    dog attack    TUBAL LIGATION      WISDOM TOOTH EXTRACTION       No family history on file.   Social History     Socioeconomic History    Marital status:      Spouse name: Not on file    Number of children: Not on file    Years of education: Not on file    Highest education level: Not on file   Occupational History    Not on file   Tobacco Use    Smoking status: Never Smoker    Smokeless tobacco: Never Used   Substance and Sexual Activity    Alcohol use: Never    Drug use: No    Sexual activity: Not on file   Other Topics Concern    Not on file   Social History Narrative    Not on file     Social Determinants of Health     Financial Resource Strain:     Difficulty of Paying Living Expenses:    Food Insecurity:     Worried About Running Out of Food in the Last Year:     [address] in the Last Year:    Transportation Needs:     Lack of Transportation (Medical):  Lack of Transportation (Non-Medical):    Physical Activity:     Days of Exercise per Week:     Minutes of Exercise per Session:    Stress:     Feeling of Stress :    Social Connections:     Frequency of Communication with Friends and Family:     Frequency of Social Gatherings with Friends and Family:     Attends [employer] Services:     Active Member of Clubs or Organizations:     Attends Club or Organization Meetings:     Marital Status:    Intimate Partner Violence:     Fear of Current or Ex-Partner:     Emotionally Abused:     Physically Abused:     Sexually Abused:      No current facility-administered medications for this encounter. Current Outpatient Medications   Medication Sig Dispense Refill    ondansetron (ZOFRAN) 4 MG tablet Take 1 tablet by mouth every 8 hours as needed for Nausea 20 tablet 0    LORazepam (ATIVAN) 0.5 MG tablet Take 0.5 mg by mouth as needed.  levothyroxine (SYNTHROID) 50 MCG tablet Take 1 tablet by mouth daily 30 tablet 2     Allergies   Allergen Reactions    Codeine     Compazine [Prochlorperazine Maleate]     Doxycycline     Percocet [Oxycodone-Acetaminophen]     Vicodin [Hydrocodone-Acetaminophen]        REVIEW OF SYSTEMS  10 systems reviewed, pertinent positives per HPI otherwise noted to be negative. PHYSICAL EXAM  /71   Pulse 62   Temp 97.2 °F (36.2 °C) (Temporal)   Resp 16   Ht 5' 6\" (1.676 m)   Wt 205 lb (93 kg)   SpO2 97%   BMI 33.09 kg/m²    Physical exam:  General appearance: awake and cooperative. Eyes closed due to dizziness. Non toxic appearing. Skin: Warm and dry. No rashes or lesions. HENT: Normocephalic. Atraumatic. Mucus membranes are dry. Neck: supple. Normal range of motion. No meningeal signs  Eyes: JAMES. EOM intact. Heart: RRR. No murmurs. Lungs: Respirations unlabored. CTAB. No wheezes, rales, or rhonchi. Good air exchange  Abdomen: No tenderness. Soft. Non distended. No peritoneal signs. Musculoskeletal: No extremity edema. Compartments soft. No deformity. No tenderness in the extremities. All extremities neurovascularly intact. Radial, Dp, and PT pulses +2/4 bilaterally  Neurological: Alert and oriented. Strength 5/5 in UE and LE bilaterally. Sensation intact x 4. No aphasia or dysarthria. CN 2-12 intact. No facial droop. No limb or gait ataxia. Right-sided nystagmus with right and left gaze. Negative test of skew. No limb ataxia. Normal heel-to-shin bilaterally. Psychiatric: Normal mood and affect. LABS  I have reviewed all labs for this visit.    Results for orders placed or performed during the hospital encounter of 08/08/21   CBC Auto Differential   Result Value Ref Range    WBC 8.8 4.0 - 11.0 K/uL    RBC 4.98 4.00 - 5.20 M/uL    Hemoglobin 14.5 12.0 - 16.0 g/dL    Hematocrit 42.3 36.0 - 48.0 %    MCV 84.9 80.0 - 100.0 fL    MCH 29.1 26.0 - 34.0 pg    MCHC 34.3 31.0 - 36.0 g/dL    RDW 13.3 12.4 - 15.4 %    Platelets 685 796 - 598 K/uL    MPV 8.1 5.0 - 10.5 fL    Neutrophils % 81.2 %    Lymphocytes % 12.7 %    Monocytes % 4.5 %    Eosinophils % 0.3 %    Basophils % 1.3 %    Neutrophils Absolute 7.2 1.7 - 7.7 K/uL    Lymphocytes Absolute 1.1 1.0 - 5.1 K/uL    Monocytes Absolute 0.4 0.0 - 1.3 K/uL    Eosinophils Absolute 0.0 0.0 - 0.6 K/uL    Basophils Absolute 0.1 0.0 - 0.2 K/uL   Comprehensive Metabolic Panel w/ Reflex to MG   Result Value Ref Range    Sodium 140 136 - 145 mmol/L    Potassium reflex Magnesium 4.0 3.5 - 5.1 mmol/L    Chloride 106 99 - 110 mmol/L    CO2 25 21 - 32 mmol/L    Anion Gap 9 3 - 16    Glucose 128 (H) 70 - 99 mg/dL    BUN 7 7 - 20 mg/dL CREATININE 0.7 0.6 - 1.1 mg/dL    GFR Non-African American >60 >60    GFR African American >60 >60    Calcium 8.9 8.3 - 10.6 mg/dL    Total Protein 7.1 6.4 - 8.2 g/dL    Albumin 4.4 3.4 - 5.0 g/dL    Albumin/Globulin Ratio 1.6 1.1 - 2.2    Total Bilirubin 0.7 0.0 - 1.0 mg/dL    Alkaline Phosphatase 53 40 - 129 U/L    ALT 13 10 - 40 U/L    AST 16 15 - 37 U/L    Globulin 2.7 g/dL   hCG, serum, qualitative   Result Value Ref Range    hCG Qual Negative Detects HCG level >10 MIU/mL   Troponin   Result Value Ref Range    Troponin <0.01 <0.01 ng/mL       ECG  The Ekg interpreted by me shows  normal sinus rhythm with a rate of 67  Axis is   Normal  QTc is  within an acceptable range  Intervals and Durations are unremarkable. ST Segments: no acute change  No significant change from prior EKG dated 12/7/20    RADIOLOGY        ED COURSE/MDM  Patient seen and evaluated. Old records reviewed. Labs and imaging reviewed and results discussed with patient. Patient is a 69-year-old female presenting for evaluation of dizziness. Feels similar to her prior episodes of BPPV. Vital signs are within normal limits. She is neurologically intact, no sign of ataxia and negative test of skew I do not suspect posterior stroke based on presentation. Labs are all within normal limits, I do not suspect ACS equivalent. EKG is unchanged from prior, no acute ischemia today. She had emesis today, was given fluids and Valium here. The patient did not report significant improvement in her symptoms therefore she was given meclizine and another liter of fluids. On reassessment, the patient did states she was feeling improved. I actually had her set up for discharge and to follow-up with physical therapy outpatient. However I was alerted that the patient states that she still had symptoms of vertigo, could not sit up from a seated position and could not ambulate because she did not feel well.   Since she is still symptomatic and

## 2021-08-12 ENCOUNTER — HOSPITAL ENCOUNTER (OUTPATIENT)
Dept: PHYSICAL THERAPY | Age: 51
Setting detail: THERAPIES SERIES
Discharge: HOME | End: 2021-08-12
Payer: COMMERCIAL

## 2021-08-12 PROCEDURE — 97112 NEUROMUSCULAR REEDUCATION: CPT

## 2021-08-12 PROCEDURE — 97161 PT EVAL LOW COMPLEX 20 MIN: CPT

## 2021-08-12 NOTE — PROGRESS NOTES
Patient goal for therapy:  \"to stop feeling so dizzy all the time\"        OBJECTIVE FINDINGS      Blood Pressure:  not tested      Cervical AROM:    WNL  Description:     Vertebral Artery test in sitting position:     Negative    Oculomotor Examination:    Room Light:   Spontaneous Nystagmus:  NO   NA   Gaze Holding Nystagmus:  NO   NA   Eye Movement Range:  conjugate  Vergence: WNL      Smooth Pursuits:     WNL   Saccades:       WNL (2 or less)   VOR (Cancellation): WNL  VOR (Slow): WNL  Head Impulse Test/ Head Thrust Test:     Negative  DVA    not tested    Fixation Blocked/Frenzel/Infrared:   [x]Not tested   []Recorded     [x]Not recorded     Positional Testing:     Right [place] Hallpike:    negative  Left [place] Hallpike:      negative   Right Roll test:      negative  Left Roll test:      negative  Head Center Test:    negative       Coordination Testing:     tested  Finger to Nose:        WNL  Alternating pronation/supination:  WNL  Heel to shin (sitting or supine):      WNL  Toe Tapping: WNL    Balance Testing:     not tested    Gait Testing:   tested  Level of Assistance needed:  Independent  Gait Deviations (firm surface/linoleum):    None  Assistive Device Used:  No AD        [x] Patient history, allergies, meds reviewed. Medical chart reviewed. See intake form. Review of Systems (ROS):  [x]Performed Review of systems (Integumentary, Cardiopulmonary, Neurological) by intake and observation. Intake form has been scanned into medical record. Patient has been instructed to contact their primary care physician regarding ROS issues if not already being addressed at this time.       Co-morbidities/Complexities (which will affect course of rehabilitation):   []None           Arthritic conditions   []Rheumatoid arthritis (M05.9)  []Osteoarthritis (M19.91)   Cardiovascular conditions   []Hypertension (I10)  []Hyperlipidemia (E78.5)  []Angina pectoris (I20)  []Atherosclerosis (I70) Musculoskeletal conditions   []Disc pathology   []Congenital spine pathologies   []Prior surgical intervention  []Osteoporosis (M81.8)  []Osteopenia (M85.8)   Endocrine conditions   []Hypothyroid (E03.9)  []Hyperthyroid Gastrointestinal conditions   []Constipation (E48.92)   Metabolic conditions   []Morbid obesity (E66.01)  []Diabetes type 1(E10.65) or 2 (E11.65)   []Neuropathy (G60.9)     Pulmonary conditions   []Asthma (J45)  []Coughing   []COPD (J44.9)   Psychological Disorders  [x]Anxiety (F41.9)  [x]Depression (F32.9)   []Other:   []Other:           Barriers to/and or personal factors that will affect rehab potential:              [x]Age  []Sex    []Smoker              []Motivation/Lack of Motivation                        [x]Co-Morbidities              []Cognitive Function, education/learning barriers              []Environmental, home barriers              []profession/work barriers  [x]past PT/medical experience  []other:  Justification:     Falls Risk Assessment (30 days):   [x] Falls Risk assessed; no intervention required. ASSESSMENT:   Patient is a  49 Y. O. fe/male presenting with diagnosis of vertigo . Pt was referred to PT from the ED with c/o severe vertigo. Pt has complex hx of BPPV , and has historically responded well to tx. Pt was treated for BPPV by PT in the ED setting. Current Assessment negative for positional BPPV in all planes. Pt was treated with Left side canalith repositioning movements for habituation . Pt does report HA and eye sensitivity, concerning for Vestibular migraine etiology. Patient will benefit from PT to address vestibular complaints and to promote return to highest level of functional independence, if symptoms persist.       Functional Impairments:  Problem List/Functional Limitations:   [x] BPPV- Negative this date.      [] Decreased Gaze Stabilization    [x] Increased Motion Sensitivity   [x] Reduced Balance/Proprioceptive control       Functional Activity Limitations (from functional questionnaire and intake)   [x]Reduced ability to tolerate prolonged functional positions   [x]Reduced ability or tolerance with driving, reading and/or computer work   [x]Reduced ability to perform lifting, reaching, carrying tasks      Participation Restrictions   []Reduced participation in self-care activities   [x]Reduced participation in home management activities      Classification:   []Signs/symptoms consistent with BPPV (benign paroxysmal positional vertigo)      [x]Signs/symptoms consistent with unilateral peripheral vestibulopathy (i.e., vestibular neuritis, labyrinthitis, acoustic neuroma)   [x]Signs/symptoms consistent with migraine-related vestibulopathy   [x]Signs/symptoms consistent with motion sensitivity      Prognosis/Rehab Potential:      []Excellent   [x]Good    []Fair   []Poor    Tolerance of evaluation/treatment:    []Excellent   [x]Good    []Fair   []Poor     Physical Therapy Evaluation Complexity Justification  [x] A history of present problem with:  [] no personal factors and/or comorbidities that impact the plan of care;  [x]1-2 personal factors and/or comorbidities that impact the plan of care  []3 personal factors and/or comorbidities that impact the plan of care  [x] An examination of body systems using standardized tests and measures addressing any of the following: body structures and functions (impairments), activity limitations, and/or participation restrictions;:  [x] a total of 1-2 or more elements   [] a total of 3 or more elements   [] a total of 4 or more elements   [x] A clinical presentation with:  [x] stable and/or uncomplicated characteristics   [] evolving clinical presentation with changing characteristics  [] unstable and unpredictable characteristics;   [x] Clinical decision making of [] low, [] moderate, [] high complexity using standardized patient assessment instrument and/or measurable assessment of functional outcome.     [x] EVAL (LOW) 75143 (typically 15 minutes face-to-face)  [] EVAL (MOD) 79067 (typically 30 minutes face-to-face)  [] EVAL (HIGH) 70951 (typically 45 minutes face-to-face)  [] RE-EVAL       PLAN: Begin PT with an emphasis on   Reassess for BPPV     Assess for cervicogenic or vestibular migraine contribution   Progress HEP as indicated    Frequency/Duration:  1 days per week for 4-6 Weeks:  Interventions:  [x]  Therapeutic exercise including: strength training, ROM, for LEs, cervical spine & UEs  [x]  NMR activation and proprioception for BLEs, vestibular training/habituation, balance, coordination  [x]  Manual therapy as indicated via: canalith repositioning maneuvers, Dry Needling/IASTM, STM, PROM, Gr I-IV mobilizations, manipulation. [x]  Modalities as needed that may include: thermal agents, E-stim, Biofeedback, US, iontophoresis as indicated  [x]  Gait training  [x]  Patient education on BPPV/vestibular function, balance, postural re-education, activity modification, progression of HEP. [x]  CRP for assessment, treatment and education of BPPV    HEP instruction: Written HEP instructions and/or education materials provided and reviewed. 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 Independent in HEP and progression per patient tolerance, in order to prevent re-injury. [] Progressing: [] Met: [] Not Met: [] Adjusted  2. Patient will have a decrease in dizziness/imbalance/symptoms to facilitate improvement in movement, function, balance and ADLs as indicated by Functional Deficits. [] Progressing: [] Met: [] Not Met: [] Adjusted    Long Term Goals: To be achieved in: 6 weeks  1. Disability index score of 48 or less for the [address] [place] [address] to assist with reaching prior level of function. [] Progressing: [] Met: [] Not Met: [] Adjusted  2.  Patient will

## 2021-08-13 NOTE — FLOWSHEET NOTE
Physical Therapy Daily Treatment Note    [x]Daily Tx Note    []Progress Note    [] Discharge Summary         Date:  2021    Patient Name:  Ron Rodriguez    :  1970  MRN: [id number]      Referring Physician:  Stephon CORREIA   PCP: Angel Laguerre                                                    Evaluation Date: 2021                                            Medical Diagnosis Information: Vertigo                                                    Insurance information:  Fritz Pisano . Requires Precert     Precautions/ Contra-indications:   Latex Allergy:  [x]? NO      []?YES       Preferred Language for Healthcare:   [x]? English       []? other:  Plan of care signed :  []  Yes  [x] No  [x]  Cosign []  Fax    Date of Patient follow up with Physician:     Is this a Progress Report:     []  Yes  [x]  No      If Yes:  Date Range for reporting period:  Beginning 2021  Ending    Progress report will be due (10 Rx or 30 days whichever is less):     3/72/63  Recertification will be due (POC Duration  / 90 days whichever is less):       Visit # Insurance Allowable Auth Required    Requires Precert [x]  Yes []  No        Functional Scale:  [facility] [address]     Date  21  Score 48    Latex Allergy:  [x]NO      []YES  Preferred Language for Healthcare:   [x]English       []other:    RESTRICTIONS/PRECAUTIONS:      SUBJECTIVE:  See eval    Pain level:  0/10      Plan Moving Forward/ For next visit:   Keagan Quevedo for BPPV    Assess for cervico-genic contribution or vestibular migraine     Progress HEP as indicated      OBJECTIVE: See eval        Exercises/Interventions:     Exercises in bold performed in department today. Items not bolded are carried forward from prior visits for continuity of the record.   Exercise/Equipment Resistance/Repetitions HEP Other comments       []        []        []        []        []        []        []        []        []          []         []        []        []        [] []        []        []        []      Therapeutic Exercise/Home Exercise Program:   0 minutes    Therapeutic Activity:  0 minutes     Gait: 0 minutes    Neuromuscular Re-Education:  30 minutes  Pt education provided regarding anatomy and physiology associated with dx, etiology of Symptoms and plan of care. All questions answered to pt satisfaction. Repositioning movements performed for habituation of vestibular system into different planes. No dizziness reported this date        Canalith Repositioning Procedure:   NOT INDICATED     Manual Therapy:  0 minutes    Modalities: 0 minutes          ASSESSMENT:   Patient is a  49 Y. O. fe/male presenting with diagnosis of vertigo . Pt was referred to PT from the ED with c/o severe vertigo. Pt has complex hx of BPPV , and has historically responded well to tx. Pt was treated for BPPV by PT in the ED setting. Current Assessment negative for positional BPPV in all planes. Pt was treated with Left side canalith repositioning movements for habituation . Pt does report HA and eye sensitivity, concerning for Vestibular migraine etiology. Patient will benefit from PT to address vestibular complaints and to promote return to highest level of functional independence, if symptoms persist.        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 Progressing: []? Met: []? Not Met: []? Adjusted     Therapist goals for Patient:   Short Term Goals: To be achieved in: 2 weeks  1. Independent in HEP and progression per patient tolerance, in order to prevent re-injury. []? Progressing: []? Met: []? Not Met: []? Adjusted  2. Patient will have a decrease in dizziness/imbalance/symptoms to facilitate improvement in movement, function, balance and ADLs as indicated by Functional Deficits. []? Progressing: []? Met: []? Not Met: []? Adjusted     Long Term Goals: To be achieved in: 6 weeks  1.  Disability index score of 48 or less for the DHI to assist with reaching prior level of function. []? Progressing: []? Met: []? Not Met: []? Adjusted  2. Patient will demonstrate increased cervical AROM to WNL, joint mobility WNL to allow for proper joint functioning as indicated by patients Functional Deficits. []? Progressing: []? Met: []? Not Met: []? Adjusted  3. Patient will demonstrate negative oculomotor special testing/positional testing as indicated by patients Functional Deficits. []? Progressing: []? Met: []? Not Met: []? Adjusted  4. Patient will return to functional activities including return to work without increased symptoms or restriction. []? Progressing: []? Met: []? Not Met: []? Adjusted         Overall Progression Towards Functional goals/ Treatment Progress Update:  [] Patient is progressing as expected towards functional goals listed. [] Progression is slowed due to complexities/Impairments listed. [] Progression has been slowed due to co-morbidities. [x] Plan just implemented, too soon to assess goals progression <30days   [] Goals require adjustment due to lack of progress  [] Patient is not progressing as expected and requires additional follow up with physician  [] Other    Prognosis for POC: [x] Good [] Fair  [] Poor    Patient requires continued skilled intervention: [x] Yes  [] No    Treatment/Activity Tolerance:  [x] Patient able to complete treatment  [] Patient limited by fatigue  [] Patient limited by pain    [] Patient limited by other medical complications  [] Other:         PLAN: See eval  [] Continue per plan of care [] Alter current plan (see comments above)  [x] Plan of care initiated [] Hold pending MD visit [] Discharge        Therapeutic Exercise and NMR EXR  [] (68071) Provided verbal/tactile cueing for activities related to strengthening, flexibility, endurance, ROM  for improvements in proximal strength and core control with self care, mobility, lifting and ambulation.   [x] (65684) Provided verbal/tactile cueing for activities related to improving balance, coordination, kinesthetic sense, posture, motor skill, proprioception  to assist with core control in self care, mobility, lifting, and ambulation. Therapeutic Activities and Gait:    [] (28250 or 55040) Provided verbal/tactile cueing for activities related to improving balance, coordination, kinesthetic sense, posture, motor skill, proprioception and motor activation to allow for proper function  with self care and ADLs  [] (03549) Provided training and instruction to the patient for proper core and proximal hip recruitment and positioning with ambulation re-education     Home Exercise Program:    [x] (35442) Reviewed/Progressed HEP activities related to strengthening, flexibility, endurance, ROM of core, proximal hip and LE for functional self-care, mobility, lifting and ambulation   [] (64348) Reviewed/Progressed HEP activities related to improving balance, coordination, kinesthetic sense, posture, motor skill, proprioception of core, proximal hip and LE for self care, mobility, lifting, and ambulation      Manual Treatments:  PROM / STM / Oscillations-Mobs:  G-I, II, III, IV (PA's, Inf., Post.)  [] (39300) Provided manual therapy to mobilize proximal hip and LS spine soft tissue/joints for the purpose of modulating pain, promoting relaxation,  increasing ROM, reducing/eliminating soft tissue swelling/inflammation/restriction, improving soft tissue extensibility and allowing for proper ROM for normal function with self care, mobility, lifting and ambulation.      []CRP:  Canalith Repositioning procedure for the assessment, treatment and education of BPPV    Modalities:       Charges:  Timed Code Treatment Minutes: 30   Total Treatment Minutes: 45            [x] EVAL    [] Dry Needling  [] MARTINES(27476)   x     [] EStim Unattended 81428  [x] NMR (26987)  x 3    [] Estim Attended  26619  [] Manual (88793)  x      [] [place]n 84235  [] TA    x [] Ultrasound  [] Gait   x  [] Vaso  [] CRP    [] Ionto           [] Other:        Electronically signed by: Niurka Blood, PT,MPT, OMT-c [id number]       Note: If patient does not return for scheduled/ recommended follow up visits, this note will serve as a discharge from care along with most recent update on progress.

## 2021-08-19 ENCOUNTER — HOSPITAL ENCOUNTER (OUTPATIENT)
Dept: PHYSICAL THERAPY | Age: 51
Setting detail: THERAPIES SERIES
Discharge: HOME | End: 2021-08-19
Payer: COMMERCIAL

## 2021-08-19 NOTE — PROGRESS NOTES
Physical Therapy  [employer] authorization still pending as of today. Cancel per department. Patient reports she is feeling better.

## 2021-10-25 ENCOUNTER — OFFICE VISIT (OUTPATIENT)
Dept: ORTHOPEDIC SURGERY | Age: 51
End: 2021-10-25
Payer: COMMERCIAL

## 2021-10-25 ENCOUNTER — TELEPHONE (OUTPATIENT)
Dept: ORTHOPEDIC SURGERY | Age: 51
End: 2021-10-25

## 2021-10-25 VITALS — WEIGHT: 200 LBS | BODY MASS INDEX: 33.32 KG/M2 | HEIGHT: 65 IN

## 2021-10-25 DIAGNOSIS — M25.561 ACUTE PAIN OF RIGHT KNEE: ICD-10-CM

## 2021-10-25 DIAGNOSIS — M70.41 PREPATELLAR BURSITIS OF RIGHT KNEE: Primary | ICD-10-CM

## 2021-10-25 DIAGNOSIS — M76.31 ILIOTIBIAL BAND TENDINITIS OF RIGHT SIDE: ICD-10-CM

## 2021-10-25 PROCEDURE — 3017F COLORECTAL CA SCREEN DOC REV: CPT | Performed by: ORTHOPAEDIC SURGERY

## 2021-10-25 PROCEDURE — 99204 OFFICE O/P NEW MOD 45 MIN: CPT | Performed by: ORTHOPAEDIC SURGERY

## 2021-10-25 PROCEDURE — 1036F TOBACCO NON-USER: CPT | Performed by: ORTHOPAEDIC SURGERY

## 2021-10-25 PROCEDURE — G8484 FLU IMMUNIZE NO ADMIN: HCPCS | Performed by: ORTHOPAEDIC SURGERY

## 2021-10-25 PROCEDURE — G8417 CALC BMI ABV UP PARAM F/U: HCPCS | Performed by: ORTHOPAEDIC SURGERY

## 2021-10-25 PROCEDURE — G8427 DOCREV CUR MEDS BY ELIG CLIN: HCPCS | Performed by: ORTHOPAEDIC SURGERY

## 2021-10-25 RX ORDER — MELOXICAM 15 MG/1
15 TABLET ORAL DAILY PRN
Qty: 30 TABLET | Refills: 0 | Status: SHIPPED | OUTPATIENT
Start: 2021-10-25

## 2021-10-25 NOTE — PROGRESS NOTES
ORTHOPAEDIC SURGERY H&P / CONSULTATION NOTE    Chief complaint: No chief complaint on file. History of present illness: The patient is a 49 y.o. female with subjective symptoms of right knee pain. The chief complaint is located at anterior aspect right knee. Duration of symptoms has been for several weeks. The severity of symptoms is rated at 9/10 pain on intake form. Patient works in construction. She has no known mechanism of injury but is on her knees a lot and rolling at work. She states occasional burning and numbness and tingling along the anterior lateral aspect of the right knee. She denies any significant pain with sitting. She has been taking Tylenol. She has not done any formal therapy. She denies gross instability or trauma to the knee. She denies gross effusion/swelling. The patient has tried the below listed items prior to today's consultation for above listed chief complaint.     -   Over-the-counter anti-inflammatories/prescription medication anti-inflammatory. -   Physical therapy / guided home exercise program -     -   Previous corticosteroid injections    Past medical history:    Past Medical History:   Diagnosis Date    Anxiety     Depression     Dizziness     Mitral valve prolapse     Thyroid disease         Past surgical history:    Past Surgical History:   Procedure Laterality Date    BREAST ENHANCEMENT SURGERY      CHOLECYSTECTOMY      COLONOSCOPY      DILATION AND CURETTAGE OF UTERUS      FACIAL RECONSTRUCTION SURGERY  1999    dog attack    TUBAL LIGATION      WISDOM TOOTH EXTRACTION          Allergies:     Allergies   Allergen Reactions    Codeine     Compazine [Prochlorperazine Maleate]     Doxycycline     Percocet [Oxycodone-Acetaminophen]     Vicodin [Hydrocodone-Acetaminophen]          Medications:   Current Outpatient Medications:     ondansetron (ZOFRAN) 4 MG tablet, Take 1 tablet by mouth every 8 hours as needed for Nausea, Disp: 20 tablet, Rfl: 0    LORazepam (ATIVAN) 0.5 MG tablet, Take 0.5 mg by mouth as needed. , Disp: , Rfl:     levothyroxine (SYNTHROID) 50 MCG tablet, Take 1 tablet by mouth daily, Disp: 30 tablet, Rfl: 2     Social history: Denies IV drug use. Social History     Socioeconomic History    Marital status:      Spouse name: Not on file    Number of children: Not on file    Years of education: Not on file    Highest education level: Not on file   Occupational History    Not on file   Tobacco Use    Smoking status: Never Smoker    Smokeless tobacco: Never Used   Substance and Sexual Activity    Alcohol use: Never    Drug use: No    Sexual activity: Not on file   Other Topics Concern    Not on file   Social History Narrative    Not on file     Social Determinants of Health     Financial Resource Strain:     Difficulty of Paying Living Expenses:    Food Insecurity:     Worried About Running Out of Food in the Last Year:     [address] in the Last Year:    Transportation Needs:     Lack of Transportation (Medical):  Lack of Transportation (Non-Medical):    Physical Activity:     Days of Exercise per Week:     Minutes of Exercise per Session:    Stress:     Feeling of Stress :    Social Connections:     Frequency of Communication with Friends and Family:     Frequency of Social Gatherings with Friends and Family:     Attends [employer] Services:     Active Member of Clubs or Organizations:     Attends Club or Organization Meetings:     Marital Status:    Intimate Partner Violence:     Fear of Current or Ex-Partner:     Emotionally Abused:     Physically Abused:     Sexually Abused: Tobacco use.     Social History     Tobacco Use   Smoking Status Never Smoker   Smokeless Tobacco Never Used     Employment: Construction    Workers compensation claim: Patient denies Worker's Compensation claim    Review of systems: Patient denies any fevers chills chest pain shortness of breath nausea vomiting significant weight loss any change in voiding or bowel movements. Patient denies any significant numbness or tingling at baseline as it relates to this presenting symptom/chief complaint. The patient denies any significant problems with skin or any significant allergies. Physical examination:  There is no height or weight on file to calculate BMI. AAOx3, NCAT  EOMI  MMM  RR  Unlabored breathing, no wheezing  Skin intact BUE and BLE, warm and moist  Bilateral lower extremity examination specific to subjective symptoms  Exam Right Lower Extremity  Negative effusion, 0/125/0 active ROM (E/F/Lag), same P assive ROM (E/F/Lag), negative anterior Drawer, 1A Lachman,    negative posterior Drawer,  Stable varus/valgus at 0 and 30?,    none TTP Joint Line, negative Arelis,   trace Ramakrishna's. Positive anterior knee right greater than left bursal prepatellar area chronic trophic changes from kneeling. Trace prepatellar bursal fluid, no erythema or warmth. Slight decrease sensation along the lateral aspect of the knee. Deep pressure is maintained but light touch is decreased. Positive Naa  Skin intact throughout  5/5 IP Q H TA G EHL  SILT DP SP LP MP S S  +2 DP pulse    Diagnostic imaging:  MY READ:  4V R knee 10/25/21: Negative fracture. Very slight arthrosis/joint space narrowing lateral patellar facet and medial compartment    Pertinent lab work:  None       Diagnosis Orders   1. Acute pain of right knee  XR KNEE RIGHT (MIN 4 VIEWS)       Assessment and plan: 49 y.o. 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   -Time of 16 minutes was spent coordinating and discussing the clinical findings, reviewing diagnostic imaging as indicated, coordinating care with prior notes review and current clinical encounter documentation as it pertains to the patient's presenting subjective symptoms and diagnoses. -I reviewed with the patient the imaging findings as well as clinical exam and  how it correlates to subjective symptoms.  -I had a pleasant discussion with the patient. I reviewed with her that currently her clinical examination correlates to compression related phenomenon likely secondary to her self-reported job and work kneeling on her knees which very clearly she does given trophic changes of the skin along the prepatellar bursa which is causing likely associated transient neuropraxia as to the terminal branches of the saphenous nerve. I recommended that she use knee sleeves/pads when at work as this would provide some additional padding  -Mobic 15 mg p.o. daily. Pain is been prescribed  -She also has findings of IT band tendinitis/tight IT band and so formal physical therapy is also been prescribed to work on SONNY knee reconditioning strengthening and stretching to include pain modalities  -Continue activity modification to include low impact  -OTC Tylenol per bottle as needed discomfort  -Should the patient still have symptoms in 12 weeks, then she will contact the office and an MRI authorization will be placed for review  -All questions answered to the patient's satisfaction and the patient expressed understanding and agreement with the above listed treatment plan  -Follow up in 12 weeks as needed  -Thank you for the clinical consultation and allowing me to participate in the patient's care. Electronically signed by Carmen Vieyra MD on 10/25/21 at 1:08 PM JAEL Vieyra MD       Orthopaedic Surgery-Sports Medicine        Disclaimer: This note was dictated with voice recognition software. Though review and correction are routinely performed, please contact the office/medical records for any errors requiring correction.

## 2021-11-15 ENCOUNTER — OFFICE VISIT (OUTPATIENT)
Dept: ORTHOPEDIC SURGERY | Age: 51
End: 2021-11-15
Payer: COMMERCIAL

## 2021-11-15 ENCOUNTER — TELEPHONE (OUTPATIENT)
Dept: ORTHOPEDIC SURGERY | Age: 51
End: 2021-11-15

## 2021-11-15 VITALS — WEIGHT: 200 LBS | BODY MASS INDEX: 33.32 KG/M2 | HEIGHT: 65 IN

## 2021-11-15 DIAGNOSIS — M25.561 ACUTE PAIN OF RIGHT KNEE: Primary | ICD-10-CM

## 2021-11-15 DIAGNOSIS — M70.41 PREPATELLAR BURSITIS OF RIGHT KNEE: ICD-10-CM

## 2021-11-15 DIAGNOSIS — G57.81 SAPHENOUS NEURITIS, RIGHT: ICD-10-CM

## 2021-11-15 PROCEDURE — G8417 CALC BMI ABV UP PARAM F/U: HCPCS | Performed by: ORTHOPAEDIC SURGERY

## 2021-11-15 PROCEDURE — G8427 DOCREV CUR MEDS BY ELIG CLIN: HCPCS | Performed by: ORTHOPAEDIC SURGERY

## 2021-11-15 PROCEDURE — 3017F COLORECTAL CA SCREEN DOC REV: CPT | Performed by: ORTHOPAEDIC SURGERY

## 2021-11-15 PROCEDURE — 1036F TOBACCO NON-USER: CPT | Performed by: ORTHOPAEDIC SURGERY

## 2021-11-15 PROCEDURE — G8484 FLU IMMUNIZE NO ADMIN: HCPCS | Performed by: ORTHOPAEDIC SURGERY

## 2021-11-15 PROCEDURE — 99213 OFFICE O/P EST LOW 20 MIN: CPT | Performed by: ORTHOPAEDIC SURGERY

## 2021-11-15 NOTE — PROGRESS NOTES
FOLLOW UP ORTHOPAEDIC NOTE    The patient follows up today for reevaluation of right knee pain. The patient states lateral knee pain. She bumped her knee this morning and had excruciating pain along the anterior lateral aspect of the proximal leg/knee. This is not unchanged from previous evaluations. She did not go to formal physical therapy as she states that she reached out to that therapy location at [facility] and they did not return her phone calls. She did not contact my office to alert us. She has been taking Mobic with some slight relief. She states that she does have pain with kneeling. 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 She does state when she is sitting on her heels without necessarily direct pressure on the anterior aspect of the knee she denies significant pain but when she rolls to the side, that is when she has pain. Again this is unchanged. PE:  AAOx3  RR  Unlabored breathing  Skin warm and moist  Focused physical examination of the right knee  Positive tenderness palpation distal aspect of trophic skin changes near the tibial tubercle. Trace prepatellar bursal fluid but no erythema or warmth. Positive decreased sensation lateral aspect of the knee/proximal tibia/leg. Skin intact throughout  5/5 IP Q H TA G EHL  SILT DP SP LP MP S S  +2 DP pulse     Diagnosis Orders   1. Acute pain of right knee  MRI KNEE RIGHT WO CONTRAST   2. Saphenous neuritis, right     3. 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   -Time of 13 minutes was spent coordinating and discussing the clinical findings and diagnostic imaging results as they pertain to the patient's presenting subjective symptoms.  -I had a pleasant discussion with the patient today. I reviewed with her that she continues to have symptoms correlating to the above listed diagnoses. I recommended she go to formal physical therapy. Our office will continue to try to assist her in scheduling her formal physical therapy.  -Continue Mobic 15 mg p.o. daily as needed pain  -MRI has been ordered. While rereview of radiographs obtained on 10/25/2021 do not show any gross foreign body, given she kneels a lot, we will see if the MRI shows anything in that regard. At this time, given the amount of very minimal prepatellar bursitis, I did not advocate for a prepatellar bursectomy. I do suspect chronic changes to the terminal nerve branches there which could be causing increased sensitivity and pain in association with neuritis  -Potential consideration depending upon MRI results for referral to pain management for RFA saphenous nerve distribution however that would be in a discussion for what potential treatment options would be available for evaluation and treatment at that time if indicated  -Continue activity modification to include low impact. I advocated for no further kneeling or direct impact onto the knees, such as carpet. This will help limit acute on chronic exacerbations  -All questions answered to the patient's satisfaction and the patient expressed understanding and agreement with the above listed treatment plan  -Follow up in after MRI completed for review  -Thank you for the clinical consultation and allowing me to participate in the patient's care. Electronically signed by Lizy Kidd MD on 11/15/21 at 11:21 AM EST         Lizy Kidd MD       Orthopaedic Surgery-Sports Medicine    Disclaimer: This note was dictated with voice recognition software. Though review and correction are routinely performed, please contact the office/medical records for any errors requiring correction.

## 2021-11-16 ENCOUNTER — TELEPHONE (OUTPATIENT)
Dept: ORTHOPEDIC SURGERY | Age: 51
End: 2021-11-16

## 2021-11-16 ENCOUNTER — HOSPITAL ENCOUNTER (OUTPATIENT)
Dept: PHYSICAL THERAPY | Age: 51
Setting detail: THERAPIES SERIES
Discharge: HOME | End: 2021-11-16
Payer: COMMERCIAL

## 2021-11-16 PROCEDURE — 97162 PT EVAL MOD COMPLEX 30 MIN: CPT

## 2021-11-16 PROCEDURE — 97110 THERAPEUTIC EXERCISES: CPT

## 2021-11-16 PROCEDURE — 97035 APP MDLTY 1+ULTRASOUND EA 15: CPT

## 2021-11-16 NOTE — PROGRESS NOTES
[address] [facility], [facility], [address]  Phone: [phone]  Fax [phone]                                                    Physical Therapy Certification    We had the pleasure of evaluating the following patient for physical therapy services at [address] [place] [address]. A summary of our findings can be found in the initial assessment below. This includes our plan of care. If you have any questions or concerns regarding these findings, please do not hesitate to contact me at the office phone number checked above.   Thank you for the referral.       Physician Signature:_______________________________Date:__________________  By signing above (or electronic signature), therapists plan is approved by physician    LOWER QUARTER EVALUATION    Patient: Dominic Medicine   : 1970   MRN: [id number]     Medical Diagnosis: Acute pain R knee (M25.561)    Treatment Diagnosis:   LE weakness, decreased functional status, laxity in LCL     Onset Date:  21  Referral Date: 10/25/21  Referring Physician: Amada Rachel MD      Visits Allowed/Insurance/Certification Information:  [employer]- 30 visits, requires auth      Restrictions/Precautions:  Anxiety, depression    C-SSRS Triggered by Intake questionnaire (Past 2 wk assessment):   [x] No, Questionnaire did not trigger screening.   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  Job duties:  Lifting up to 150#, standing, walking, kneeling, squatting, climbing, pushing, pulling, crawling      Social support/Environment:    Family/caregiver support:   [x]Yes, has a boyfriend (5 kids 27, 29,27,10,8)   []No    Home Environment:   [x]1 story- no basement   []>2 story   [x] IRINA-2   [x]No rail   []Handrail     Health History reviewed with pt:   [x]Yes   []No      PMH:  Hypothyroid, anxiety, depression, dizziness, vertigo, mitral valve prolapse, breast enhancement surgery, choley, D&C uterus, facial reconstruction due to dog attack, tubal, wisdom tooth removal    SUBJECTIVE FINDINGS         History of Present Illness:         Pt reports insidious onset 2 months ago of R knee pain that occurs with work activity, especially kneeling and shifting weight in a kneeling position. Pain has gotten progressively worse since August.  Saw ortho on 10/25/21, who prescribed her mobic. She doesn't feel the mobic is helping much. She followed up again on 11/15/21 due to her pain, and MD ordering MRI. Referred for PT treatment. Xray in office: Diagnostic imaging:  MY READ:  4V R knee 10/25/21: Negative fracture. Very slight arthrosis/joint space narrowing lateral patellar facet and medial compartment     Pain       Patient describes pain to be intermittent in R knee, lateral aspect, and patellar tendon, \"fire pain\"  Patient reports pain is  0/10 pain at present and 8-9 /10 pain at its worst.  Worsened by:  Kneeling, touching it, shifting weight in kneeling position    Improved by:  Avoiding painful activities    Pt. reports knee/hip/ankle crepitus, locking, buckling:   []Yes   [x]No   []NA     Current Functional Limitations:   [x]Yes   []No  Functional complaints:  Limited with kneeling, modify work activity to accommodate limitations     PLOF:   [x]No functional limitations   []Pt with previous functional limitations   Pt's sleep is affected?    []Yes   [x]No     Patient goal for therapy:  \"no more pain\"          OBJECTIVE FINDINGS         Gait/Steps/Balance       [x]Gait WNL unless otherwise noted below:                             []Deviations on a level linoleum surface include:      [x]Steps WNL (reciprocal pattern with 0-1 rail) unless otherwise noted below: []Deviations include: alt pattern with unilateral rail    [x]All balance WNL unless otherwise noted below:      Static/ Dynamic Sitting:    Static/Dynamic Standing:  SLS 10 sec B    Quick Tests/Functional Myotome Tests:   Unilateral sit to stand (L1-3)   []NT  []Able to perform WNL   []Unable to perform     Heel Walk (L4):       []NT  [x]Able to perform WNL   []Unable to perform         Toe Walk (S1):        []NT  [x]Able to perform WNL   []Unable to perform        Posture: [x] WNL  []Forward head   []Forward flexed trunk    []Pronounced CT junction    []Increased thoracic kyphosis   []Increased lumbar lordosis   []Scoliosis   []Swayback   []Decreased WB on   []R   []L     []Other:       Sensation   [x]All dermatomes WNL for light touch, but reports decreased sensation to light touch over areas of burning pain (patellar tendon and LCL)    Range of Motion/Strength Testing-Myotomes    [x]All ROM WNL except as marked below    [x]All strength WNL (5/5) except as marked below (measured out of 5)   [x]All  myotomes WNL (5/5) except as marked below (measured out of 5)      Range Tested AROM PROM MMT/Resisted Comments   *denotes pain Left Right Left Right Left Right    Hip Flexion  (L1-2)     4+/5 4+/5    Hip Extension     4-/5 4-/5    Hip Abduction  (L5)     5/5 4/5    Hip Adduction  (L3)     4/5 4/5    Hip IR          Hip ER          Knee Flexion  (L5,S1) 139 143   5/5 5/5    Knee Extension  (L3,4) +4 +2   5/5 5/5    Ankle Dorsiflex  (L4)     5/5 5/5    Ankle Plantarflex  (S1,2)          Ankle Inversion          Ankle Eversion          Great Toe Ext  (L5)              Flexibility     Muscle Findings   Hip flexors/Richy  []WNL   []Decreased R   []Decreased L   []NT   Hamstrings  Degrees in 90/90 []WNL   [x]Decreased R   [x]Decreased L   []NT  Right: -30             Left:  -20    Gastrocs []WNL   []Decreased R   []Decreased L   []NT   Obers/TFL/ITB []WNL   []Decreased R   []Decreased L   []NT   Piriformis  []WNL []Morbid obesity (E66.01)  []Diabetes type 1(E10.65) or 2 (E11.65)   []Neuropathy (G60.9)     Pulmonary conditions   []Asthma (J45)  []Coughing   []COPD (J44.9)   Psychological Disorders  [x]Anxiety (F41.9)  [x]Depression (F32.9)   []Other:   [x]Other:    vertigo       Functional Outcome Measure:   []NA  Measure Used:  LEFS  Date Assessed:  11/16/21  Score: 10%    ASSESSMENT: Patient presents with s/s consistent with Dx. Recommend PT treatment to address problem list so that the patient may return to regular activities without any functional limitations noted. Functional Impairments:     []Noted lumbar/proximal hip/LE joint hypomobility   []Decreased LE functional ROM   [x]Decreased core/proximal hip strength and neuromuscular control   [x]Decreased LE functional strength   [x]Reduced balance/proprioceptive control   []other:      Functional Activity Limitations (from functional questionnaire and intake)   [x]Reduced ability to tolerate prolonged functional positions   [x]Reduced ability or difficulty with changes of positions or transfers between positions   [x]Reduced ability to maintain good posture and demonstrate good body mechanics with sitting, bending, and lifting   []Reduced ability to sleep   [] Reduced ability or tolerance with driving and/or computer work   [x]Reduced ability to perform lifting, carrying tasks   [x]Reduced ability to squat   []Reduced ability to forward bend   []Reduced ability to ambulate prolonged functional periods/distances/surfaces   []Reduced ability to ascend/descend stairs   []Reduced ability to run, hop, cut or jump   [x]other: reduced ability to kneel    Participation Restrictions   []Reduced participation in self care activities   []Reduced participation in home management activities   [x]Reduced participation in work activities   []Reduced participation in social activities. []Reduced participation in sport/recreation activities.     Classification :    []Signs/symptoms consistent with post-surgical status including decreased ROM, strength and function. [x]Signs/symptoms consistent with joint sprain/strain   []Signs/symptoms consistent with patella-femoral syndrome   []Signs/symptoms consistent with knee OA/hip OA   []Signs/symptoms consistent with internal derangement of knee/Hip   []Signs/symptoms consistent with functional hip weakness/NMR control      [x]Signs/symptoms consistent with tendinitis/tendinosis    []signs/symptoms consistent with pathology which may benefit from Dry needling      []other:      Rehabilitation Potential:  Good for goals listed below. Strengths for achieving goals include:   [x]Pt motivated   [x]PLOF   [x]Family support   Limitations for achieving goals include: []Pain  []Severity of condition     []Cognitive   []Lack of family support   []Lack of resources     []Other:         Prognosis:   [x]Good   []Fair   []Poor    GOALS:  Short Term Goals: 2 weeks  1. Independent in HEP and progression per patient tolerance, in order to prevent re-injury. [] Progressing: [] Met: [] Not Met: [] Adjusted   2. Patient will have a decrease in pain to facilitate improvement in movement, function, and ADLs as indicated by Functional Deficits. [] Progressing: [] Met: [] Not Met: [] Adjusted     Long Term Goals:  8 weeks  1. Disability index score of <10%  for the LEFS functional questionnaire to assist with reaching prior level of function. [] Progressing: [] Met: [] Not Met: [] Adjusted   2. Patient will demonstrate increased AROM R HS to WNL to allow for proper joint functioning as indicated by patients Functional Deficits. [] Progressing: [] Met: [] Not Met: [] Adjusted   3. Patient will demonstrate an increase in strength to 4+/5 or greater in R LE to allow for proper functional mobility as indicated by patients Functional Deficits. [] Progressing: [] Met: [] Not Met: [] Adjusted   4.  Patient will return to all transfers, work activities, and functional activities without increased symptoms or restriction. [] Progressing: [] Met: [] Not Met: [] Adjusted   5. Patient will have 0-2/10 pain with ADL's.  [] Progressing: [] Met: [] Not Met: [] Adjusted   6. Patient stated goal: return to work activity without limitations  [] Progressing: [] Met: [] Not Met: [] Adjusted     PLAN OF CARE     To see patient  1-2 x/week for 8  weeks for the following treatment interventions:  [x]Therapeutic Exercise  [x]Modalities of Choice:   []Heat   []Cold   []US   []Estim   []Ionto  []Mechanical Traction   [x]Manual Therapy  [x]Neuromuscular Re-Education  [x]Gait Training   [x]Therapeutic Activity  []Other     Physical Therapy Evaluation Complexity Justification  [] EVAL (LOW) 51026 (typically 20 minutes face-to-face)  [x] EVAL (MOD) 87863 (typically 30 minutes face-to-face)  [] EVAL (HIGH) 88458 (typically 45 minutes face-to-face)  [] RE-EVAL     Thank you for the referral of this patient.       Timed Code Treatment Minutes:  40   minutes   Total Treatment Time:  60  minutes    Mark Cuba, PT MPT, OMT-C [id number]

## 2021-11-16 NOTE — FLOWSHEET NOTE
Therapeutic Exercise/Home Exercise Program:   x30 min. Patient educated on eval findings, plan of care, and goals. Instructed in HEP with handout given. Group Therapy:    0 minutes    Therapeutic Activity:  0 minutes     Gait: 0 minutes    Neuromuscular Re-Education:  0 minutes      Canalith Repositioning Procedure:  0 minutes    Manual Therapy:  0 minutes    Modalities: 10 minutes  US to R knee patellar tendon and LCL, 50%, 1.5 w/cm2 in supine with knee bent   [] GAME READY (VASO)- for significant edema, swelling, pain control.- declined today     Functional Outcome Measure:   []NA  Measure Used:  LEFS  Date Assessed:  11/16/21  Score: 10%    Assessment/Treatment/Activity Tolerance:    Patients response to treatment:   [x]Patient tolerated treatment well with no adverse reactions noted    []Patient limited by fatigue   []Patient limited by pain    []Patient limited by other medical complications   [x]Other:  Pain 0/10 after treatment    Goals:   Progress towards goals:  Goals established on 11/16/21   GOALS:  Short Term Goals: 2 weeks  1. Independent in HEP and progression per patient tolerance, in order to prevent re-injury. []? Progressing: []? Met: []? Not Met: []? Adjusted            2. Patient will have a decrease in pain to facilitate improvement in movement, function, and ADLs as indicated by Functional Deficits. []? Progressing: []? Met: []? Not Met: []? Adjusted               Long Term Goals:  8 weeks  1. Disability index score of <10%  for the LEFS functional questionnaire to assist with reaching prior level of function. []? Progressing: []? Met: []? Not Met: []? Adjusted           2. Patient will demonstrate increased AROM R HS to WNL to allow for proper joint functioning as indicated by patients Functional Deficits. []? Progressing: []? Met: []? Not Met: []? Adjusted            3.  Patient will demonstrate an increase in strength to 4+/5 or greater in R LE to allow for proper functional mobility as indicated by patients Functional Deficits. []? Progressing: []? Met: []? Not Met: []? Adjusted            4. Patient will return to all transfers, work activities, and functional activities without increased symptoms or restriction. []? Progressing: []? Met: []? Not Met: []? Adjusted            5. Patient will have 0-2/10 pain with ADL's.  []? Progressing: []? Met: []? Not Met: []? Adjusted            6. Patient stated goal: return to work activity without limitations  []? Progressing: []? Met: []? Not Met: []? Adjusted      Prognosis: [x]Good   []Fair   []Poor    Patient Requires Follow-up:  [x]Yes  []No    Plan: [x]Plan of care initiated     []Continue per plan of care    [] Alter current plan (see comments)    []Hold pending MD visit []Discharge    Charges:  Timed Code Treatment Minutes: 40   Total Treatment Minutes:  60   [facility] time for each procedure?:  TE TIME:  NMR TIME:  MANUAL TIME:  UNTIMED MINUTES:       [] EVAL (LOW) 58646 (typically 20 minutes face-to-face)  [x] EVAL (MOD) 69657 (typically 30 minutes face-to-face)  [] EVAL (HIGH) 72844 (typically 45 minutes face-to-face)  [] RE-EVAL     [x] YY(23541) x2     [] IONTO  [] NMR (60430) x     [] VASO  [] Manual (91896) x     [x] Other:  US  [] TA x      [] Mech Traction (84087)  [] ES(attended) (77714)     [] ES (un) (98943): Medicare Cap total YTD:   [x]N/A    Electronically signed by:  Gaudencio West, PT, MPT, OMT-C [id number]      Note: If patient does not return for scheduled/ recommended follow up visits, this note will serve as a discharge from care along with most recent update on progress.

## 2021-11-16 NOTE — TELEPHONE ENCOUNTER
Left voicemail for patient that their MRI has been authorized and that they can call and schedule scan at their convenience. Also told them that they can call and schedule a f/u with Dr. Joe Ortiz once they have MRI scheduled, leaving at least 2-3 days for our office to receive their results.

## 2021-11-17 NOTE — PROGRESS NOTES
Physical Therapy  Patient called to cancel vertigo evaluation scheduled for 11 18 21, reporting she is feeling better & is no longer dizzy. Unable to assess due to medical condition

## 2021-11-18 ENCOUNTER — HOSPITAL ENCOUNTER (OUTPATIENT)
Dept: PHYSICAL THERAPY | Age: 51
Setting detail: THERAPIES SERIES
Discharge: HOME | End: 2021-11-18
Payer: COMMERCIAL

## 2021-11-30 ENCOUNTER — HOSPITAL ENCOUNTER (OUTPATIENT)
Dept: PHYSICAL THERAPY | Age: 51
Setting detail: THERAPIES SERIES
Discharge: HOME | End: 2021-11-30
Payer: COMMERCIAL

## 2021-11-30 PROCEDURE — 97110 THERAPEUTIC EXERCISES: CPT

## 2021-11-30 PROCEDURE — 97035 APP MDLTY 1+ULTRASOUND EA 15: CPT

## 2021-11-30 NOTE — FLOWSHEET NOTE
[address] [place] [address] [facility] [address]  Phone [phone]  Fax [phone]    Outpatient Physical Therapy     [x] Daily Treatment Note   [] Progress Note   [] Discharge Note    Date:  11/30/2021    Patient Name:  Karel Holbrook         YOB: 1970    Medical Diagnosis: Acute pain R knee (M25.561)    Treatment Diagnosis:   LE weakness, decreased functional status, laxity in LCL     Onset Date:  8/1/21  Referral Date: 10/25/21  Referring Physician: Leon Bello MD      Visits Allowed/Insurance/Certification Information:  [employer]- 30 visits, requires auth      Restrictions/Precautions:  Anxiety, depression    Plan of care sent to provider:   []NA   []Faxed   [x]Co-signature       Plan of care signed:    [x]NA   []Yes   []No      Progress report will be due (10 Rx or 30 days whichever is less):  68/59/41     Recertification will be due (POC Duration  / 90 days whichever is less): 2/16/22     Visit# / total visits:     Visit # Insurance Allowable Auth Required   In Person 2/16 30 v 48 units 11/16/21-1/8/22 [x]  Yes     []  No    [facility] [employer] 0 Ex,nmr,man,TA []  Yes     []  No    Total 2/16       Plan for Next Session:  Add leg press, knee ext machine, fwd/lat step ups, prone HS curls     Pt late  Subjective:  Reports she does not have hard time walking but it is when kneeling for work. Reports going today for MRI results. Reports she was swollen from mid thigh into ankle after last visit. Pain level: Pain still the same  AT EVAL: Patient describes pain to be intermittent in R knee, lateral aspect, and patellar tendon, \"fire pain\"  Patient reports pain is  0/10 pain at present and 8-9 /10 pain at its worst.  Worsened by:  Kneeling, touching it, shifting weight in kneeling position        Objective:       Exercises:    Exercises in bold performed in department today.   Items not bolded are carried forward from prior visits for continuity of the record. Exercise/Equipment Resistance/Repetitions Issued for HEP     nustep S9 L5 x 5     4-way SLR on mat (flex, ext, abd, add) x10 each direction x    LAQ x10 x    Seated HS stretch 30 sec/ x3 x                                                                                                          Therapeutic Exercise/Home Exercise Program:   x28 min. Only nustep added today due to pt reports of incr swelling after last visit, will monitor and progress accordingly       Group Therapy:    0 minutes    Therapeutic Activity:  0 minutes     Gait: 0 minutes    Neuromuscular Re-Education:  0 minutes      Canalith Repositioning Procedure:  0 minutes    Manual Therapy:  0 minutes    Modalities: 10 minutes  US to R knee patellar tendon and LCL, 50%, 1.5 w/cm2 in supine with knee bent   [] GAME READY (VASO)- for significant edema, swelling, pain control.- declined today     Functional Outcome Measure:   []NA  Measure Used:  LEFS  Date Assessed:  11/16/21  Score: 10%    Assessment/Treatment/Activity Tolerance:    Patients response to treatment:   [x]Patient tolerated treatment well with no adverse reactions noted    []Patient limited by fatigue   []Patient limited by pain    []Patient limited by other medical complications   [x]Other:  Pain 0/10 after treatment    Goals:   Progress towards goals:  Goals established on 11/16/21   GOALS:  Short Term Goals: 2 weeks  1. Independent in HEP and progression per patient tolerance, in order to prevent re-injury. []? Progressing: []? Met: []? Not Met: []? Adjusted            2. Patient will have a decrease in pain to facilitate improvement in movement, function, and ADLs as indicated by Functional Deficits. []? Progressing: []? Met: []? Not Met: []? Adjusted               Long Term Goals:  8 weeks  1. Disability index score of <10%  for the LEFS functional questionnaire to assist with reaching prior level of function. []? Progressing: []?  Met: []? Not Met: []? Adjusted           2. Patient will demonstrate increased AROM R HS to WNL to allow for proper joint functioning as indicated by patients Functional Deficits. []? Progressing: []? Met: []? Not Met: []? Adjusted            3. Patient will demonstrate an increase in strength to 4+/5 or greater in R LE to allow for proper functional mobility as indicated by patients Functional Deficits. []? Progressing: []? Met: []? Not Met: []? Adjusted            4. Patient will return to all transfers, work activities, and functional activities without increased symptoms or restriction. []? Progressing: []? Met: []? Not Met: []? Adjusted            5. Patient will have 0-2/10 pain with ADL's.  []? Progressing: []? Met: []? Not Met: []? Adjusted            6. Patient stated goal: return to work activity without limitations  []? Progressing: []? Met: []? Not Met: []? Adjusted      Prognosis: [x]Good   []Fair   []Poor    Patient Requires Follow-up:  [x]Yes  []No    Plan: [x]Plan of care initiated     []Continue per plan of care    [] Alter current plan (see comments)    []Hold pending MD visit []Discharge    Charges:  Timed Code Treatment Minutes: 38   Total Treatment Minutes:  38   [facility] time for each procedure?:  TE TIME:  NMR TIME:  MANUAL TIME:  UNTIMED MINUTES:       [] EVAL (LOW) 38831 (typically 20 minutes face-to-face)  [] EVAL (MOD) 03870 (typically 30 minutes face-to-face)  [] EVAL (HIGH) 55324 (typically 45 minutes face-to-face)  [] RE-EVAL     [x] YV(37123) x2     [] IONTO  [] NMR (23299) x     [] VASO  [] Manual (93888) x     [x] Other:  US  [] TA x      [] Mech Traction (36329)  [] ES(attended) (76825)     [] ES (un) (90326): Medicare Cap total YTD:   [x]N/A    Electronically signed by:  Arnoldo Orr, [id number]        Note: If patient does not return for scheduled/ recommended follow up visits, this note will serve as a discharge from care along with most recent update on progress.

## 2021-12-06 ENCOUNTER — OFFICE VISIT (OUTPATIENT)
Dept: ORTHOPEDIC SURGERY | Age: 51
End: 2021-12-06
Payer: COMMERCIAL

## 2021-12-06 VITALS — WEIGHT: 200 LBS | HEIGHT: 65 IN | BODY MASS INDEX: 33.32 KG/M2

## 2021-12-06 DIAGNOSIS — M17.11 LOCALIZED OSTEOARTHRITIS OF RIGHT KNEE: Primary | ICD-10-CM

## 2021-12-06 DIAGNOSIS — M22.41 PATELLA, CHONDROMALACIA, RIGHT: ICD-10-CM

## 2021-12-06 DIAGNOSIS — M25.561 ACUTE PAIN OF RIGHT KNEE: ICD-10-CM

## 2021-12-06 DIAGNOSIS — G57.81 SAPHENOUS NEURITIS, RIGHT: ICD-10-CM

## 2021-12-06 PROCEDURE — G8427 DOCREV CUR MEDS BY ELIG CLIN: HCPCS | Performed by: ORTHOPAEDIC SURGERY

## 2021-12-06 PROCEDURE — G8484 FLU IMMUNIZE NO ADMIN: HCPCS | Performed by: ORTHOPAEDIC SURGERY

## 2021-12-06 PROCEDURE — 1036F TOBACCO NON-USER: CPT | Performed by: ORTHOPAEDIC SURGERY

## 2021-12-06 PROCEDURE — 3017F COLORECTAL CA SCREEN DOC REV: CPT | Performed by: ORTHOPAEDIC SURGERY

## 2021-12-06 PROCEDURE — 99214 OFFICE O/P EST MOD 30 MIN: CPT | Performed by: ORTHOPAEDIC SURGERY

## 2021-12-06 PROCEDURE — G8417 CALC BMI ABV UP PARAM F/U: HCPCS | Performed by: ORTHOPAEDIC SURGERY

## 2021-12-06 NOTE — PROGRESS NOTES
FOLLOW UP ORTHOPAEDIC NOTE    The patient follows up today for reevaluation of right knee pain. The patient states she obtained her right knee MRI. She presents today to review the results. She states that she has not been kneeling on her knees much. Her symptoms are improved somewhat at 3/10 pain but otherwise if she knocks her knee against something then she definitely has pain. She also states anterior related knee pain with crisscross applesauce sitting. This is anterolateral as well    Focused physical examination  AAOx3  RR  Unlabored breathing  Skin warm and moist  Focused physical examination of the right knee, positive Ramakrishna's, positive tenderness palpation proximal patellar tendon origin. Positive decreased sensation along the anterolateral aspect of the knee lateral to midline and the saphenous nerve distribution  Unchanged clinical examination. Pertinent radiographs/imaging:  MRI 11/23/2021 right knee: ACL PCL LCL MCL intact. No lateral meniscus tear. Positive grade 2 intrameniscal posterior horn medial meniscus signal changes. No exiting articular tear. Positive patellar chondromalacia mid central facet and medial facet. Positive mild patellar tendinosis proximal patellar origin. Positive edema along the superficial subcutaneous fat area of the patellar tendon insertion New tibial tubercle. No clearly identifiable foreign body     Diagnosis Orders   1. Patella, chondromalacia, right  EUFLEXXA INJECTION (For Auth/Precert)   2. Saphenous neuritis, right  AFL ([facility]) Aidan Louise MD, Pain Management, [facility]   3. 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   -Time of 16 minutes was spent coordinating and discussing the clinical findings and diagnostic imaging results as they pertain to the patient's presenting subjective symptoms.  -I took extensive time today to review her MRI with her and also reviewed with her that currently the majority of her symptoms appear to be anterior/anterolateral in nature in association with patellar chondromalacia as well as saphenous neuritis which I suspect is from chronic overuse with regard to kneeling on her knee. She states the symptoms are slightly better and is not having much pain with ADLs however when she does kneel directly on the knee and with other unrelated subtle injuries banging her knee against things that that is what causes significant pain. -I reviewed with her consideration for viscosupplementation injection therapy for the right knee. This would be to treat patellar chondromalacia. She wishes to pursue this understand the risk and benefits. Authorizations to be submitted  -With regard to the saphenous neuritis, I recommended topical Voltaren gel. She may do this in alternating with previous Mobic prescription and continue formal physical therapy. This also be utilized with therapy for treatment of patellar chondromalacia as well as generalized pain modalities. It sounds like ultrasound has aggravated her knee somewhat and so physical therapy can hold off of this modality  -A referral to pain management has been placed for consideration of potential medication treatment for overall neuritis but also in consideration for procedural of saphenous nerve Hydro dissection or possible RF a treatment of saphenous nerve. -OTC Tylenol per bottle parent discomfort and activity modification.   Limit direct impact of kneeling on the knee at this time to limit exacerbating symptoms  -All questions answered to the patient's satisfaction and the patient expressed understanding and agreement with the above listed treatment plan  -Follow up in once authorizations have been approved for viscosupplementation injections  -Thank you for the clinical consultation and allowing me to participate in the patient's care. Electronically signed by Harley Gifford MD on 12/6/21 at [address] [facility] PM EST         Harley Gifford MD       Orthopaedic Surgery-Sports Medicine    Disclaimer: This note was dictated with voice recognition software. Though review and correction are routinely performed, please contact the office/medical records for any errors requiring correction.

## 2021-12-07 ENCOUNTER — HOSPITAL ENCOUNTER (OUTPATIENT)
Dept: PHYSICAL THERAPY | Age: 51
Setting detail: THERAPIES SERIES
Discharge: HOME | End: 2021-12-07

## 2021-12-07 NOTE — FLOWSHEET NOTE
[address] [facility] [address]  Phone [phone]  Fax [phone]    Physical Therapy  Cancellation/No-show Note    Patient Name:  Ewa Kimball  :  1970   Date:  2021  Cancels to Date: 0  No-shows to Date: 1    For today's appointment patient:  []  Cancelled  []  Rescheduled appointment  [x]  No-show     Reason given by patient:  []  Patient ill  []  Conflicting appointment  []  No transportation    []  Conflict with work  []  No reason given  []  Other:     Comments:  Staff called and left message confirming next appt  Electronically signed Rosario Orellana, [id number]

## 2021-12-10 ENCOUNTER — HOSPITAL ENCOUNTER (OUTPATIENT)
Dept: PHYSICAL THERAPY | Age: 51
Setting detail: THERAPIES SERIES
Discharge: HOME | End: 2021-12-10

## 2021-12-10 NOTE — FLOWSHEET NOTE
[address] [facility] [address] [address]  Phone [phone]  Fax [phone]    Physical Therapy  Cancellation/No-show Note    Patient Name:  Andie Gates  :  1970   Date:  12/10/2021  Cancels to Date: 0  No-shows to Date: 2    For today's appointment patient:  []  Cancelled  []  Rescheduled appointment  [x]  No-show     Reason given by patient:  []  Patient ill  []  Conflicting appointment  []  No transportation    []  Conflict with work  []  No reason given  []  Other:     Comments:  Staff called and left message confirming next appt  Electronically signed by:  Temi Ramsey, PT, MPT, OMT-C [id number]

## 2021-12-13 NOTE — ADDENDUM NOTE
Addended by: Brigida Hampton on: 12/13/2021 01:04 PM     Modules accepted: Orders Provider at bedside        Brandi Peralta RN  06/13/18 [id number]

## 2021-12-14 ENCOUNTER — TELEPHONE (OUTPATIENT)
Dept: ORTHOPEDIC SURGERY | Age: 51
End: 2021-12-14

## 2021-12-14 ENCOUNTER — HOSPITAL ENCOUNTER (OUTPATIENT)
Dept: PHYSICAL THERAPY | Age: 51
Setting detail: THERAPIES SERIES
Discharge: HOME | End: 2021-12-14

## 2021-12-14 NOTE — TELEPHONE ENCOUNTER
Called patient to let them know that we got authorization for their Durolane injection. Left message for her to call and set up an appointment.

## 2022-02-07 ENCOUNTER — OFFICE VISIT (OUTPATIENT)
Dept: ORTHOPEDIC SURGERY | Age: 52
End: 2022-02-07
Payer: COMMERCIAL

## 2022-02-07 DIAGNOSIS — G57.81 SAPHENOUS NEURITIS, RIGHT: ICD-10-CM

## 2022-02-07 DIAGNOSIS — M22.41 PATELLA, CHONDROMALACIA, RIGHT: ICD-10-CM

## 2022-02-07 DIAGNOSIS — M25.561 RIGHT KNEE PAIN, UNSPECIFIED CHRONICITY: Primary | ICD-10-CM

## 2022-02-07 DIAGNOSIS — S83.91XA SPRAIN OF RIGHT KNEE, UNSPECIFIED LIGAMENT, INITIAL ENCOUNTER: ICD-10-CM

## 2022-02-07 PROCEDURE — E0114 CRUTCH UNDERARM PAIR NO WOOD: HCPCS | Performed by: PHYSICIAN ASSISTANT

## 2022-02-07 PROCEDURE — G8427 DOCREV CUR MEDS BY ELIG CLIN: HCPCS | Performed by: PHYSICIAN ASSISTANT

## 2022-02-07 PROCEDURE — 1036F TOBACCO NON-USER: CPT | Performed by: PHYSICIAN ASSISTANT

## 2022-02-07 PROCEDURE — 99214 OFFICE O/P EST MOD 30 MIN: CPT | Performed by: PHYSICIAN ASSISTANT

## 2022-02-07 PROCEDURE — G8417 CALC BMI ABV UP PARAM F/U: HCPCS | Performed by: PHYSICIAN ASSISTANT

## 2022-02-07 PROCEDURE — G8484 FLU IMMUNIZE NO ADMIN: HCPCS | Performed by: PHYSICIAN ASSISTANT

## 2022-02-07 PROCEDURE — 3017F COLORECTAL CA SCREEN DOC REV: CPT | Performed by: PHYSICIAN ASSISTANT

## 2022-02-07 NOTE — PROGRESS NOTES
Dr Gabrielle Orozco      Date /Time 2/7/2022       5:10 PM EST  Name Foster Souza             1970   Location  [facility]  MRN <[id number]>                No chief complaint on file. History of Present Illness  Foster Souza is a 47 y.o. female who presents with  right knee pain,. Sent in consultation by Sandro Barnett,. Patient presents to the after-hours clinic today with a chief complaint of right knee pain. Patient's right knee became painful approximately 2 hours ago. She slipped on ice and demonstrates a valgus injury. Her pain symptoms are concentrated lateral.    Patient has been previously treated for this problem by Dr. Betzy Houston. Patient was last seen by Dr. Berny Elliott on 12/6/2021. She was diagnosed with patellofemoral chondromalacia and ordered Euflexxa. She was also diagnosed with saphenous neuritis and educated on the use of topical Voltaren gel and meloxicam.    Past History  Past Medical History:   Diagnosis Date    Anxiety     Depression     Dizziness     Mitral valve prolapse     Thyroid disease      Past Surgical History:   Procedure Laterality Date    BREAST ENHANCEMENT SURGERY      CHOLECYSTECTOMY      COLONOSCOPY      DILATION AND CURETTAGE OF UTERUS      FACIAL RECONSTRUCTION SURGERY  1999    dog attack    TUBAL LIGATION      WISDOM TOOTH EXTRACTION       Social History     Tobacco Use    Smoking status: Never Smoker    Smokeless tobacco: Never Used   Substance Use Topics    Alcohol use: Never      Current Outpatient Medications on File Prior to Visit   Medication Sig Dispense Refill    meloxicam (MOBIC) 15 MG tablet Take 1 tablet by mouth daily as needed for Pain 30 tablet 0    ondansetron (ZOFRAN) 4 MG tablet Take 1 tablet by mouth every 8 hours as needed for Nausea 20 tablet 0    LORazepam (ATIVAN) 0.5 MG tablet Take 0.5 mg by mouth as needed.       levothyroxine (SYNTHROID) 50 MCG tablet Take 1 tablet by mouth daily 30 tablet 2     No current facility-administered medications on file prior to visit. ASCVD 10-YEAR RISK SCORE  The ASCVD Risk score (Caitlin Prado., et al., 2013) failed to calculate for the following reasons:    Cannot find a previous HDL lab    Cannot find a previous total cholesterol lab     Review of Systems  10-point ROS is negative other than HPI. Physical Exam  Based off 1997 Exam Criteria  There were no vitals taken for this visit. Constitutional:       General: He is not in acute distress. Appearance: Normal appearance. Cardiovascular:      Rate and Rhythm: Normal rate and regular rhythm. Pulses: Normal pulses. Pulmonary:      Effort: Pulmonary effort is normal. No respiratory distress. Neurological:      Mental Status: He is alert and oriented to person, place, and time. Mental status is at baseline. Musculoskeletal:  Gait:  normal  Lumbar spine: There is no swelling, warmth, or erythema. Range of motion is within normal limits. There is no paraspinal or spinous process tenderness. . The distal neurovascular exam is grossly intact and symmetric. Paul Hip: Examination of the right and left hip reveals intact skin. The patient demonstrates full painless range of motion with regards to flexion, abduction, internal and external rotation. There is no tenderness about the greater trochanter. R Knee: Examination of the right knee reveals intact skin. Focal tenderness over the lateral greater than medial joint line. No gross instability to either varus or valgus stress test with no increase in pain symptoms. Negative anterior drawer and Lachman's. Positive Arelis sign. Positive patellofemoral grind test.  Patient is able to perform a straight leg raise test with ease. No weakness of the extensor mechanism        Imaging  Right Knee: [address] [facility],[address]  Radiographs: X-rays were ordered today and reviewed of the right knee. 4 views. Standing AP, standing AP flexed, lateral, and skyline views. They demonstrate no evidence of fractures or dislocations. Mild thinning of the lateral patella facet and mild lateral tilt. Positive patellofemoral grind test      Procedure:  Orders Placed This Encounter   Procedures    XR KNEE RIGHT (MIN 4 VIEWS)     Standing Status:   Future     Number of Occurrences:   1     Standing Expiration Date:   3/7/2022       Assessment and Plan  Raphael Fiore was seen today for pain. Diagnoses and all orders for this visit:    Right knee pain, unspecified chronicity  -     XR KNEE RIGHT (MIN 4 VIEWS); Future  -     Aluminum Crutches    Patella, chondromalacia, right  -     Aluminum Crutches    Saphenous neuritis, right    Sprain of right knee, unspecified ligament, initial encounter        Patient has an acutely injured knee. We have discussed treatment options ranging from ordering the patient a new MRI to conservative care. At this point we will initiate conservative care. She will start her meloxicam and Voltaren gel. In addition I will put her on crutches. I would like you to be nonweightbearing and work on nonweightbearing range of motion exercises. I will have her see Dr. Maxine Contreras over the next couple of days. Depending on symptoms and clinical exam at that time patient may need new MRI. I discussed with Teddy Merino that her history, symptoms, signs and imaging are most consistent with meniscal injury and ligamentous injury. We reviewed the natural history of these conditions and treatment options ranging from conservative measures (rest, icing, activity modification, physical therapy, pain meds, cortisone injection) to surgical options. In terms of treatment, I recommended continuing with rest, icing, avoidance of painful activities, NSAIDs or pain meds as tolerated, and physical therapy. If these are not effective, cortisone injection can be considered. We discussed surgical options as well, should conservative measures fail.     Electronically signed by Xavier Hernandez PA-C on 2/7/2022 at 5:10 PM  This dictation was generated by voice recognition computer software. Although all attempts are made to edit the dictation for accuracy, there may be errors in the transcription that are not intended.

## 2022-02-10 ENCOUNTER — OFFICE VISIT (OUTPATIENT)
Dept: ORTHOPEDIC SURGERY | Age: 52
End: 2022-02-10
Payer: COMMERCIAL

## 2022-02-10 ENCOUNTER — TELEPHONE (OUTPATIENT)
Dept: ORTHOPEDIC SURGERY | Age: 52
End: 2022-02-10

## 2022-02-10 VITALS — BODY MASS INDEX: 32.95 KG/M2 | HEIGHT: 66 IN | WEIGHT: 205 LBS

## 2022-02-10 DIAGNOSIS — M25.561 ACUTE PAIN OF RIGHT KNEE: ICD-10-CM

## 2022-02-10 DIAGNOSIS — S76.111A STRAIN OF RIGHT QUADRICEPS, INITIAL ENCOUNTER: Primary | ICD-10-CM

## 2022-02-10 PROCEDURE — 3017F COLORECTAL CA SCREEN DOC REV: CPT | Performed by: ORTHOPAEDIC SURGERY

## 2022-02-10 PROCEDURE — G8417 CALC BMI ABV UP PARAM F/U: HCPCS | Performed by: ORTHOPAEDIC SURGERY

## 2022-02-10 PROCEDURE — G8484 FLU IMMUNIZE NO ADMIN: HCPCS | Performed by: ORTHOPAEDIC SURGERY

## 2022-02-10 PROCEDURE — 1036F TOBACCO NON-USER: CPT | Performed by: ORTHOPAEDIC SURGERY

## 2022-02-10 PROCEDURE — G8427 DOCREV CUR MEDS BY ELIG CLIN: HCPCS | Performed by: ORTHOPAEDIC SURGERY

## 2022-02-10 PROCEDURE — 99213 OFFICE O/P EST LOW 20 MIN: CPT | Performed by: ORTHOPAEDIC SURGERY

## 2022-02-10 NOTE — TELEPHONE ENCOUNTER
Called patient to let them know that their MRI has been authorized and that they can call and schedule scan at their convenience. Also told them that they can call and schedule a f/u with Dr. Betyz Houston once they have MRI scheduled, leaving at least 2-3 days for our office to receive their results.

## 2022-02-10 NOTE — PROGRESS NOTES
FOLLOW UP ORTHOPAEDIC NOTE    The patient follows up today for reevaluation of right knee. The patient states she felt that she hyperextended her knee while falling off a curb. She had been seen previously for patellar chondromalacia with viscosupplementation injection therapy authorization has been submitted and approved. She states that she has pain underneath the kneecap currently. She denies significant swelling. She feels that her leg is just\" hanging\". She has been taking Mobic as well as Tylenol and using crutches as needed. She presents today after having gone to after-hours clinic on 2022    PE:  AAOx3  RR  Unlabored breathing  Skin warm and moist  Focused physical examination of the right knee  No effusion. 0/90 degrees knee flexion with pain along the anterior aspect of the knee with attempted further flexion. Able to do straight leg raise albeit with pain along the quadriceps tendon insertion. There is no palpable defect. Positive Ramakrishna's, positive lateral patellar facet tenderness. Nontender to palpation medial/lateral joint line  Negative anterior drawer negative posterior drawer  Skin intact throughout  5/5 IP Q H TA G EHL  SILT DP SP LP MP S S  +2 DP pulse    Pertinent radiographs/imagin view right knee 2022: Negative fracture. Positive patellofemoral joint space narrowing. Positive medial joint space narrowing     Diagnosis Orders   1. Strain of right quadriceps, initial encounter  MRI KNEE RIGHT WO CONTRAST   2. Acute pain of right knee       Assessment and plan: 47 female with continued subjective symptoms of right knee pain with known, correlating diagnosis of right knee quadriceps strain.  -Time of 16 minutes was spent coordinating and discussing the clinical findings and diagnostic imaging results as they pertain to the patient's presenting subjective symptoms.  -The patient has pain along the quadriceps insertion.   I recommended an MRI to look at this further as she

## 2022-02-17 ENCOUNTER — OFFICE VISIT (OUTPATIENT)
Dept: ORTHOPEDIC SURGERY | Age: 52
End: 2022-02-17
Payer: COMMERCIAL

## 2022-02-17 DIAGNOSIS — M22.41 CHONDROMALACIA OF PATELLA, RIGHT: ICD-10-CM

## 2022-02-17 DIAGNOSIS — M25.561 ACUTE PAIN OF RIGHT KNEE: ICD-10-CM

## 2022-02-17 DIAGNOSIS — S86.911D KNEE STRAIN, RIGHT, SUBSEQUENT ENCOUNTER: Primary | ICD-10-CM

## 2022-02-17 DIAGNOSIS — S86.111A GASTROCNEMIUS STRAIN, RIGHT, INITIAL ENCOUNTER: ICD-10-CM

## 2022-02-17 PROCEDURE — G8484 FLU IMMUNIZE NO ADMIN: HCPCS | Performed by: ORTHOPAEDIC SURGERY

## 2022-02-17 PROCEDURE — 1036F TOBACCO NON-USER: CPT | Performed by: ORTHOPAEDIC SURGERY

## 2022-02-17 PROCEDURE — L1810 KO ELASTIC WITH JOINTS: HCPCS | Performed by: ORTHOPAEDIC SURGERY

## 2022-02-17 PROCEDURE — 3017F COLORECTAL CA SCREEN DOC REV: CPT | Performed by: ORTHOPAEDIC SURGERY

## 2022-02-17 PROCEDURE — G8427 DOCREV CUR MEDS BY ELIG CLIN: HCPCS | Performed by: ORTHOPAEDIC SURGERY

## 2022-02-17 PROCEDURE — G8417 CALC BMI ABV UP PARAM F/U: HCPCS | Performed by: ORTHOPAEDIC SURGERY

## 2022-02-17 PROCEDURE — 99214 OFFICE O/P EST MOD 30 MIN: CPT | Performed by: ORTHOPAEDIC SURGERY

## 2022-02-17 RX ORDER — MELOXICAM 15 MG/1
15 TABLET ORAL DAILY PRN
Qty: 30 TABLET | Refills: 0 | Status: SHIPPED | OUTPATIENT
Start: 2022-02-17 | End: 2022-03-21

## 2022-02-17 NOTE — PROGRESS NOTES
FOLLOW UP ORTHOPAEDIC NOTE    The patient follows up today for reevaluation of right knee pain. The patient states she obtained her MRI and is here for the results. She is accompanied by her . Having posterior and anterior knee pain. PE:  AAOx3  RR  Unlabored breathing  Skin warm and moist  Focused physical examination of the right knee   unchanged clinical examination    Pertinent radiographs/imaging:  Right knee MRI 2/15/2022:  Impression   CONCLUSION:   Interval lateral femoral condylar subchondral fracture. Complex laminar interstitial tear of the distal quadriceps myotendinous junction. Low to intermediate   grade. Consider distal femur MRI to further characterize the quadriceps mechanism. Posterior medial meniscocapsular attachment sprain without articular meniscal tear. Insall-Salvati ratio is less than 0.8. Patellar tendon length (TL), patellar length (PL). Findings suggestive of non-fixed lateral pressure syndrome of the patella.         MY READ: ACL PCL LCL MCL intact. Posterior lateral corner intact. Posterior medial corner intact. Positive quadriceps tendinosis along the course and towards insertion not significantly changed compared to MRI November 23/21. Positive edema in the lateral femoral condyle without subchondral collapse. Positive intrameniscal degeneration posterior medial meniscus without clearly exiting articular tear. No gross lateral meniscus tear. Positive edema along the gastrocnemius insertion posterior distal femoral condyle. Positive patellar chondromalacia     Diagnosis Orders   1.  Knee strain, right, subsequent encounter  [facility]    meloxicam (MOBIC) 15 MG tablet    [employer] Hinged Knee Brace     Assessment and plan: 47 female with continued subjective symptoms of right knee pain with known, correlating diagnosis of right knee gastrocnemius strain/knee strain in the setting of lateral femoral condyle edema/subchondral fracture likely to full using crutches and wean off crutches over the next 2 to 4 weeks as needed  -Thank you for the clinical consultation and allowing me to participate in the patient's care. Electronically signed by Mayo Spears MD on 2/17/22 at 12:57 PM MAURISIO Spears MD       Orthopaedic Surgery-Sports Medicine    Disclaimer: This note was dictated with voice recognition software. Though review and correction are routinely performed, please contact the office/medical records for any errors requiring correction.

## 2022-03-17 ENCOUNTER — OFFICE VISIT (OUTPATIENT)
Dept: ORTHOPEDIC SURGERY | Age: 52
End: 2022-03-17
Payer: COMMERCIAL

## 2022-03-17 DIAGNOSIS — M17.11 PRIMARY OSTEOARTHRITIS OF RIGHT KNEE: ICD-10-CM

## 2022-03-17 DIAGNOSIS — M22.41 CHONDROMALACIA OF PATELLA, RIGHT: Primary | ICD-10-CM

## 2022-03-17 DIAGNOSIS — G89.29 CHRONIC PAIN OF RIGHT KNEE: ICD-10-CM

## 2022-03-17 DIAGNOSIS — S86.911D STRAIN OF RIGHT KNEE, SUBSEQUENT ENCOUNTER: ICD-10-CM

## 2022-03-17 DIAGNOSIS — M25.561 CHRONIC PAIN OF RIGHT KNEE: ICD-10-CM

## 2022-03-17 PROCEDURE — G8417 CALC BMI ABV UP PARAM F/U: HCPCS | Performed by: ORTHOPAEDIC SURGERY

## 2022-03-17 PROCEDURE — 1036F TOBACCO NON-USER: CPT | Performed by: ORTHOPAEDIC SURGERY

## 2022-03-17 PROCEDURE — 3017F COLORECTAL CA SCREEN DOC REV: CPT | Performed by: ORTHOPAEDIC SURGERY

## 2022-03-17 PROCEDURE — 99213 OFFICE O/P EST LOW 20 MIN: CPT | Performed by: ORTHOPAEDIC SURGERY

## 2022-03-17 PROCEDURE — G8484 FLU IMMUNIZE NO ADMIN: HCPCS | Performed by: ORTHOPAEDIC SURGERY

## 2022-03-17 PROCEDURE — G8427 DOCREV CUR MEDS BY ELIG CLIN: HCPCS | Performed by: ORTHOPAEDIC SURGERY

## 2022-03-17 NOTE — PROGRESS NOTES
FOLLOW UP ORTHOPAEDIC NOTE    The patient follows up today for reevaluation of right knee. The patient states 5/10 pain. She feels that she is done quite well over the last month and had not done any formal physical therapy but has been using the knee brace with hinges and has been working on her range of motion. She feels that the knee is doing much better but does still notice some lateral calf/posterior lateral knee related tightness. She still is working on range of motion. She states that she has been taking the Mobic and Tylenol and this has been helping. She states that she was 25% partial weightbearing for that months time and then progressed to weightbearing as tolerated with crutch use and then discontinue the crutches and had returned to work this past Monday and feels that she is being able to do well in general speaking terms albeit with work-related duty modifications. She states dull throbbing achy pain  anterior aspect of the knee as well as medial/lateral based globally    PE:  AAOx3  RR  Unlabored breathing  Skin warm and moist  Focused physical examination of the right knee  0 to 122 degrees knee flexion. Very slight tenderness along the posterior lateral gastrocnemius. No gross effusion. Remainder of neurovascular exam unchanged     Diagnosis Orders   1. Chondromalacia of patella, right     2. Strain of right knee, subsequent encounter     3. Chronic pain of right knee         Assessment and plan: 47 female with continued subjective symptoms of  right knee pain with known, correlating diagnosis of right knee strain/gastrocnemius strain.  -Time of 16 please see previous notes for minutes was spent coordinating and discussing the clinical findings and diagnostic imaging results as they pertain to the patient's presenting subjective symptoms.  -Treatment plan details and extensive discussions.   At this time she is done quite well symptomatically and feels that her knee is in a better place than where it was previously. I also feel strongly that with activity modification to include job/duty related modifications have dramatically improved her overall ability to return as well as following the initial treatment course to include partial weightbearing and range of motion progressions.  -I did advocate that I do want her to get the formal physical therapy to work on SONNY hip knee core reconditioning which I do feel will be best served for her longer term given chondromalacia/arthritic related symptoms  -Should she wish to pursue viscosupplementation injection therapy at this time, then a authorization will be submitted to ensure still viable for right knee intra-articular space viscosupplementation injection therapy. Given she is roughly about 5 weeks out from most recent injury, consideration for this injection therapy and then she will be followed up with this early next week for scheduling  -Continue activity modification to include low impact elliptical stationary bike swimming and walking to include job-related activity modifications  -OTC Tylenol Aleve per bottle as needed discomfort  -All questions answered to the patient's satisfaction and the patient expressed understanding and agreement with the above listed treatment plan  -Follow up in next week for VSI R knee IA space for treatment of right knee chondromalacia/OA  -Thank you for the clinical consultation and allowing me to participate in the patient's care. Electronically signed by Monica Thomas MD on 3/17/22 at 10:16 AM EDT         Monica Thomas MD       Orthopaedic Surgery-Sports Medicine    Disclaimer: This note was dictated with voice recognition software. Though review and correction are routinely performed, please contact the office/medical records for any errors requiring correction.

## 2022-03-21 DIAGNOSIS — S86.911D KNEE STRAIN, RIGHT, SUBSEQUENT ENCOUNTER: ICD-10-CM

## 2022-03-21 RX ORDER — MELOXICAM 15 MG/1
TABLET ORAL
Qty: 30 TABLET | Refills: 0 | Status: SHIPPED | OUTPATIENT
Start: 2022-03-21

## 2022-03-22 ENCOUNTER — HOSPITAL ENCOUNTER (OUTPATIENT)
Dept: PHYSICAL THERAPY | Age: 52
Setting detail: THERAPIES SERIES
Discharge: HOME | End: 2022-03-22

## 2022-03-22 NOTE — FLOWSHEET NOTE
[facility] [facility],  [address], [address] [place] [address]        Physical Therapy  Cancellation/No-show Note  Patient Name:  Chelsea Councilman  :  1970   Date:  3/22/2022  Cancelled visits to date: 0  No-shows to date: 1    For today's appointment patient:  []  Cancelled  []  Rescheduled appointment  [x]  No-show     Reason given by patient:  []  Patient ill  []  Conflicting appointment  []  No transportation    []  Conflict with work  [x]  No reason given  []  Other:     Comments:      Electronically signed by:  Millie Medina, PT

## 2022-03-23 ENCOUNTER — TELEPHONE (OUTPATIENT)
Dept: ORTHOPEDIC SURGERY | Age: 52
End: 2022-03-23

## 2022-03-23 NOTE — TELEPHONE ENCOUNTER
PATIENT IS AUTHORIZED FOR DUROLANE INJECTION. CALLED AND LVM TO LET HER KNOW AND THAT SHE CAN MAKE THIS APPOINTMNET.

## 2022-03-25 ENCOUNTER — TELEPHONE (OUTPATIENT)
Dept: ORTHOPEDIC SURGERY | Age: 52
End: 2022-03-25

## 2022-03-25 NOTE — TELEPHONE ENCOUNTER
Called patient and LVM to let her know that the Durolane injection has been approved and she can schedule this.

## 2022-05-09 ENCOUNTER — OFFICE VISIT (OUTPATIENT)
Dept: ORTHOPEDIC SURGERY | Age: 52
End: 2022-05-09
Payer: COMMERCIAL

## 2022-05-09 DIAGNOSIS — M17.11 PRIMARY OSTEOARTHRITIS OF RIGHT KNEE: Primary | ICD-10-CM

## 2022-05-09 DIAGNOSIS — M25.561 CHRONIC PAIN OF RIGHT KNEE: ICD-10-CM

## 2022-05-09 DIAGNOSIS — G89.29 CHRONIC PAIN OF RIGHT KNEE: ICD-10-CM

## 2022-05-09 PROCEDURE — 20610 DRAIN/INJ JOINT/BURSA W/O US: CPT | Performed by: ORTHOPAEDIC SURGERY

## 2022-05-09 NOTE — PROGRESS NOTES
FOLLOW UP ORTHOPAEDIC NOTE    The patient follows up today for reevaluation of right knee pain. The patient states  5/10 on intake form. She is here for viscosupplementation injection with previous authorization gained for OA treatment    PE:  AAOx3  RR  Unlabored breathing  Skin warm and moist  Focused physical examination of the right knee  No erythema, no effusion     Diagnosis Orders   1. Primary osteoarthritis of right knee  OR ARTHROCENTESIS ASPIR&/INJ MAJOR JT/BURSA W/O US    sodium hyaluronate (DUROLANE) injection PRSY 60 mg   2. Chronic pain of right knee         Assessment and plan: Plan 47 female with continued subjective symptoms of right knee pain with known, correlating diagnosis of right knee osteoarthritis/chondromalacia. -Time of 12 minutes was spent coordinating and discussing the clinical findings and diagnostic imaging results as they pertain to the patient's presenting subjective symptoms.  -I had a pleasant discussion with the patient today. I reviewed with her consideration for viscosupplementation injection for treatment of knee osteoarthritis. Understanding the risks and benefits, she wishes to proceed  --The patient was educated to the risks and benefits of the injection series and understanding these risks and benefits, the patient wished to proceed and a verbal consent was obtained. After sterile prep of the right knee, a 3 cc viscosupplementation injection (Lot #[id number] for Durolane) was administered into the intra articular space. The patient tolerated the procedure well.  -Continue activity modification to include low impact.   Elliptical stationary bike swimming and walking  -OTC Tylenol and previously discussed anti-inflammatories as indicated  -All questions answered to the patient's satisfaction and the patient expressed understanding and agreement with the above listed treatment plan  -Follow up in 6 months for repeat injection as needed  -Thank you for the clinical consultation and allowing me to participate in the patient's care. Electronically signed by Callie Parker MD on 5/9/22 at 2:56 PM EDT         Callie Parker MD       Orthopaedic Surgery-Sports Medicine    Disclaimer: This note was dictated with voice recognition software. Though review and correction are routinely performed, please contact the office/medical records for any errors requiring correction.

## 2023-08-03 ENCOUNTER — OFFICE VISIT (OUTPATIENT)
Dept: ORTHOPEDIC SURGERY | Age: 53
End: 2023-08-03

## 2023-08-03 VITALS — BODY MASS INDEX: 32.95 KG/M2 | HEIGHT: 66 IN | WEIGHT: 205 LBS

## 2023-08-03 DIAGNOSIS — M25.571 RIGHT ANKLE PAIN, UNSPECIFIED CHRONICITY: Primary | ICD-10-CM

## 2023-08-03 DIAGNOSIS — M77.51 BONE SPUR OF RIGHT FOOT: ICD-10-CM

## 2023-08-03 RX ORDER — METHYLPREDNISOLONE 4 MG/1
TABLET ORAL
Qty: 1 KIT | Refills: 0 | Status: SHIPPED | OUTPATIENT
Start: 2023-08-03

## 2023-08-03 NOTE — PROGRESS NOTES
CHIEF COMPLAINT:    Chief Complaint   Patient presents with    Ankle Pain     right       HISTORY OF PRESENT ILLNESS:                The patient is a 47 y.o. female who presents today for evaluation of R ankle pain/swelling. Pain started end of June. No injury she can recall. May have twisted her ankle in the past.  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 Points to dorsal and medial aspect of ankle when asked where pain is located. Pain increases with activity and WB. Some relief with tylenol/ibuprofen. Not diabetic. Past Medical History:   Diagnosis Date    Anxiety     Depression     Dizziness     Mitral valve prolapse     Thyroid disease           The pain assessment was noted & is as follows:  Pain Assessment  Location of Pain: Ankle  Location Modifiers: Right  Severity of Pain: 7  Quality of Pain: Other (Comment)  Duration of Pain: Other (Comment)  Frequency of Pain: Other (Comment)  Aggravating Factors:  Other (Comment)]      Work Status/Functionality:     Past Medical History: Medical history form was reviewed today & can be found in the media tab  Past Medical History:   Diagnosis Date    Anxiety     Depression     Dizziness     Mitral valve prolapse     Thyroid disease       Past Surgical History:     Past Surgical History:   Procedure Laterality Date    BREAST ENHANCEMENT SURGERY      CHOLECYSTECTOMY      COLONOSCOPY      DILATION AND CURETTAGE OF UTERUS      FACIAL RECONSTRUCTION SURGERY  1999    dog attack    TUBAL LIGATION      WISDOM TOOTH EXTRACTION       Current Medications:     Current Outpatient Medications:     methylPREDNISolone (MEDROL, VÍCTOR,) 4 MG tablet, By mouth., Disp: 1 kit, Rfl: 0    meloxicam (MOBIC) 15 MG tablet, TAKE 1 TABLET BY MOUTH EVERY DAY AS NEEDED FOR PAIN, Disp: 30 tablet, Rfl: 0    meloxicam (MOBIC) 15 MG tablet, Take 1 tablet by mouth daily as needed for Pain, Disp: 30 tablet, Rfl: 0    ondansetron (ZOFRAN) 4 MG tablet, DISCHARGE ORDER  Date/Time 2020 1:53 PM  Completed by: Jah Brown, Case Management    Patient Name: David Hebert    : 1935    Admitting Diagnosis: Sepsis ([address] [A41.9]    Financial Payer Type : BCBS Medicare  Admit order Date and Status:2020 0045 inpt  (verify MD's last order for status of admission/Traditional Medicare 3 day qualifying stay required for SNF)    Noted discharge order. Confirmed discharge plan  (pt): Yes  with whom_______________  If pt confirmed DC plan does family need to be contacted by CM Yes if yes who_pt's daughter/POA, Estella Esparza, who Is at bedside_____  Discharge Plan: Reviewed chart. Role of discharge planner explained and patient and Estella Vilma verbalized understanding. Discharge order is noted. [facility]  [facility]. Pt's O2 sats are 97% on RA. CM faxed ULICES/AVS/HENS to [phone]. [facility] is aware pt has a PICC line and will be going on vanc and shama an dper Dr. Yue Menezes, will need 39 more days of IV anbx via PICC. Estella Vilma and pt is also aware. No further discharge needs needed or noted. Discharge orders and Continuity of Care faxed to facility: Yes  Hospital Exemption Notification System complete: Yes  Transportation arranged: Yes - [facility]  Pick-up @ 1815. Patient / Family (pt and Estella Vilma) aware of  time: Yes   Nursing aware of  time: Yes, Barrington Benson RN  Receiving facility aware of  time: Yes, [facility] Solomon  Pre-cert obtained? No    Discharge timeout done with Barrington Benson RN. All discharge needs and concerns addressed. Discharging nurse to complete ULICES, reconcile AVS, and place final copy with patient's discharge packet. Discharging RN to ensure that written prescriptions for  Level II medications are sent with patient to the facility as per protocol.

## 2023-08-14 ENCOUNTER — OFFICE VISIT (OUTPATIENT)
Dept: ORTHOPEDIC SURGERY | Age: 53
End: 2023-08-14

## 2023-08-14 VITALS — WEIGHT: 200 LBS | HEIGHT: 66 IN | BODY MASS INDEX: 32.14 KG/M2

## 2023-08-14 DIAGNOSIS — M65.9 SYNOVITIS OF RIGHT ANKLE: ICD-10-CM

## 2023-08-14 DIAGNOSIS — M25.571 RIGHT ANKLE PAIN, UNSPECIFIED CHRONICITY: Primary | ICD-10-CM

## 2023-08-14 DIAGNOSIS — M76.821 POSTERIOR TIBIAL TENDINITIS OF RIGHT LOWER EXTREMITY: ICD-10-CM

## 2023-08-14 RX ORDER — MELOXICAM 15 MG/1
15 TABLET ORAL DAILY
Qty: 30 TABLET | Refills: 3 | Status: SHIPPED | OUTPATIENT
Start: 2023-08-14

## 2023-08-14 NOTE — PROGRESS NOTES
RESIDENT/ATTENDING ATTESTATION:    After medical student / resident evaluation / PA evaluation and exam, I independently gathered patients history, independently did a physical, and agree with A/P as written in medical student's note (other than clarified below). Please see below for additional information documented by the resident/attending including the A/P. David Clifton MD          Chief Complaint    Initial Consultation Ankle Pain ([facility] R ANKLE )      History of Present Illness:  Windy Douglass is a 47 y.o. 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 Curtis Crump who presents for evaluation of right ankle pain. Patient states she was having pain beginning in June that was initially 2/10 pain. There was no reported injury trauma or no activity prior to becoming symptomatic. Her initial pain was anterior medial in nature. She then went on vacation to the [place] in July, in which there was much walking as well as camping on uneven ground. Her pain became worse afterwards with 7/10 pain. She states she has injured the same ankle before when she stepped into a rabbit hole. She has some relief with Tylenol/ibuprofen. She was seen 8/3 and was given a Medrol pack and walking boot which she says has been helping with her pain. Pain is at the anterior medial portion of her ankle over the posterior tib tendon but also over the medial talar dome. There is no known history of injury although she has had sprains in the past.  She has had very little in the way of treatment. She is being seen today for orthopedic and sports consultation review of her imaging. Medical History    Patient's medications, allergies, past medical, surgical, social and family histories were reviewed and updated as appropriate.     Review of Systems    Pertinent items are noted from 3/2023 noted in HPI      Vital Signs  There were no vitals filed for this

## 2023-08-15 ENCOUNTER — TELEPHONE (OUTPATIENT)
Dept: ORTHOPEDIC SURGERY | Age: 53
End: 2023-08-15

## 2023-08-15 NOTE — TELEPHONE ENCOUNTER
LVM FOR PATIENT TO CALL BACK TO VERIFY INSURANCE. PRE CERT OFFICE IS ATTEMPTING TO AUTHORIZE MRI HOWEVER, [facility] NOT [facility]IF[facility] THROUGH THEM. PATIENT WILL CALL BACK TO CENTRAL SCHEDULING TO UPDATE INSURANCE INFO.

## 2023-08-28 ENCOUNTER — TELEPHONE (OUTPATIENT)
Dept: ORTHOPEDIC SURGERY | Age: 53
End: 2023-08-28

## 2023-08-28 NOTE — TELEPHONE ENCOUNTER
CALLED PATIENT BACK AND LET HER KNOW THAT AFTER [facility] THE [facility] WEBS[facility], SHE IS STILL LISTED AS INACTIVE. PATIENT STATES SHE RECEIVED A LETTER IN THE MAIL SAYING OTHERWISE. INFORMED PATIENT THAT IT WOULD BE BEST TO CONTACT HER INSURANCE DIRECTLY AND MAKE SURE THAT SHE HAS ALL THE CORRECT INFORMATION. PATIENT WILL CALL INSURANCE THIS EVENING AFTER SHE GETS BACK FROM WORK AND CALL US BACK.

## 2023-08-28 NOTE — TELEPHONE ENCOUNTER
Other Patient is requesting a call back regarding Insurance and mri  says their mri did not go through due to insurance but now it should work would like a call back to get mri information

## 2023-09-18 ENCOUNTER — TELEPHONE (OUTPATIENT)
Dept: ORTHOPEDIC SURGERY | Age: 53
End: 2023-09-18

## 2023-09-18 NOTE — TELEPHONE ENCOUNTER
SPOKE TO PATIENT AND SHE HAS NEW INSURANCE THAT IS SUPPOSED TO BE ACTIVE AS OF TOMORROW. I AM GOING TO HAVE PATIENT UPDATE INFORMATION WITH OUR  STAFF. WE WILL RE-SUBMIT TO INSURANCE TOMORROW ONCE INSURANCE IS ACTIVE TO GET MRI AUTHORIZED. TOLD PATIENT I WOULD GIVE HER A CALL BACK ONCE MRI IS APPROVED.

## 2023-09-18 NOTE — TELEPHONE ENCOUNTER
General Question     Subject: QUESTIONS ABOUT MRI  Patient and /or Facility Request: Tamica Rowland  Contact Number: [phone]    PATIENT CALLED STATING DR. Kwabena Patelrows HER AN MRI, SHE DID NOT REALIZE HER INSURANCE HAD LAPSED BUT SHE IS GETTING NEW INSURANCE AND IT STARTS TOMORROW. PATIENT IS LOOKING TO GET MRI DONE AFTER INSURANCE STARTS TOMORROW. PATIENT IS REQUESTING A CALL BACK FROM DR. OROPEZA'S MEDICAL STAFF. PLEASE CALL PT BACK AT THE ABOVE NUMBER.

## 2023-09-19 DIAGNOSIS — M25.571 RIGHT ANKLE PAIN, UNSPECIFIED CHRONICITY: ICD-10-CM

## 2023-09-19 DIAGNOSIS — M77.51 BONE SPUR OF RIGHT FOOT: Primary | ICD-10-CM

## 2023-09-19 DIAGNOSIS — M65.9 SYNOVITIS OF RIGHT ANKLE: ICD-10-CM

## 2023-09-19 DIAGNOSIS — M76.821 POSTERIOR TIBIAL TENDINITIS OF RIGHT LOWER EXTREMITY: Primary | ICD-10-CM

## 2023-09-19 DIAGNOSIS — M76.821 POSTERIOR TIBIAL TENDINITIS OF RIGHT LOWER EXTREMITY: ICD-10-CM

## 2023-09-20 ENCOUNTER — OFFICE VISIT (OUTPATIENT)
Dept: ORTHOPEDIC SURGERY | Age: 53
End: 2023-09-20
Payer: COMMERCIAL

## 2023-09-20 ENCOUNTER — TELEPHONE (OUTPATIENT)
Dept: ORTHOPEDIC SURGERY | Age: 53
End: 2023-09-20

## 2023-09-20 DIAGNOSIS — M65.9 SYNOVITIS OF RIGHT ANKLE: ICD-10-CM

## 2023-09-20 DIAGNOSIS — M25.571 RIGHT ANKLE PAIN, UNSPECIFIED CHRONICITY: Primary | ICD-10-CM

## 2023-09-20 DIAGNOSIS — S80.11XA CONTUSION OF RIGHT LOWER EXTREMITY, INITIAL ENCOUNTER: ICD-10-CM

## 2023-09-20 DIAGNOSIS — M76.821 POSTERIOR TIBIAL TENDINITIS OF RIGHT LOWER EXTREMITY: ICD-10-CM

## 2023-09-20 PROCEDURE — 99214 OFFICE O/P EST MOD 30 MIN: CPT | Performed by: FAMILY MEDICINE

## 2023-09-20 RX ORDER — GABAPENTIN 300 MG/1
CAPSULE ORAL
Qty: 90 CAPSULE | Refills: 1 | Status: SHIPPED | OUTPATIENT
Start: 2023-09-20 | End: 2023-10-20

## 2023-09-20 NOTE — TELEPHONE ENCOUNTER
SPOKE TO PATIENT, TOLD HER WE WOULD SEE HER THIS AFTERNOON AND DO A NEW XRAY TO MAKE SURE THERE IS NO FRACTURE WITH THE NEW INJURY. TOLD HER WE WERE STILL WAITING TO HEAR ABOUT MRI STATUS FROM HER INSURANCE.

## 2023-09-20 NOTE — TELEPHONE ENCOUNTER
Patient came into the office and stated that she called her insurance company and they gave her another number to get verification and authorization of benefits for her insurance is [phone]. They stated that they have been having issues with the other number. Patient requested a call back to verify that you got this message and was able to get a hold of someone.

## 2023-09-20 NOTE — PROGRESS NOTES
her boot fell apart. She was having ongoing pain and stiffness of but not requiring crutches and recently she did get a new boot but last evening on 9/19/2023 she went out to her kennel and attempting let her large dogs out and subsequently struck her with a planted foot in the boot laterally involving the right leg. There is no recurrent inversion or eversion and she was in a boot but since that time is have worsening medial ankle pain and some swelling with limited ability to bear weight and she is now back on crutches. Most of her pain is medial in nature and she does have considerable stiffness after spending the last 4-1/2 to 5 weeks in a boot. She has been icing is continue with her anti-inflammatories and is actually worse than her original visit last month. She does have new insurance at this point. Since her injury last night she is complaining of a burning pain medially. She is being seen today for orthopedic and sports reevaluation with updated imaging. Medical History    Patient's medications, allergies, past medical, surgical, social and family histories were reviewed and updated as appropriate. Review of Systems    Pertinent items are noted from 3/2023 noted in HPI      Vital Signs  There were no vitals filed for this visit. General Exam:     Constitutional: Patient is adequately groomed with no evidence of malnutrition  DTRs: Deep tendon reflexes are intact  Mental Status: The patient is oriented to time, place and person. The patient's mood and affect are appropriate. Lymphatic: The lymphatic examination bilaterally reveals all areas to be without enlargement or induration. Vascular: Examination reveals no swelling or calf tenderness. Peripheral pulses are palpable and 2+. Neurological: The patient has good coordination. There is no weakness or sensory deficit. Ankle Examination  Inspection:  No swelling or bruising. No evidence of effusion or palpable spasm.   No

## 2023-09-21 ENCOUNTER — TELEPHONE (OUTPATIENT)
Dept: ORTHOPEDIC SURGERY | Age: 53
End: 2023-09-21

## 2023-09-21 NOTE — TELEPHONE ENCOUNTER
CALLED PATIENT AND LET HER KNOW THAT WE DID RECEIVE THE PHONE NUMBER PROVIDED. I FORWARDED THE NUMBER TO MRI PRE CERT OFFICE. WILL CALL PATIENT ONCE MRI IS AUTHORIZED OR IF WE HAVE ANY ADDITIONAL QUESTIONS. ALSO GAVE PATIENT OUR DIRECT PHONE NUMBER.

## 2023-09-21 NOTE — TELEPHONE ENCOUNTER
Left voicemail for patient that their MRI has been authorized and that they can call and schedule scan at their convenience. Also told them that they can call and schedule a f/u with Dr. Vic Vieyra once they have MRI scheduled, leaving at least 2-3 days for our office to receive their results.

## 2023-10-02 ENCOUNTER — OFFICE VISIT (OUTPATIENT)
Dept: ORTHOPEDIC SURGERY | Age: 53
End: 2023-10-02

## 2023-10-02 VITALS — BODY MASS INDEX: 32.14 KG/M2 | HEIGHT: 66 IN | WEIGHT: 200 LBS

## 2023-10-02 DIAGNOSIS — M76.821 POSTERIOR TIBIAL TENDINITIS OF RIGHT LOWER EXTREMITY: ICD-10-CM

## 2023-10-02 DIAGNOSIS — M84.374A STRESS FRACTURE OF NAVICULAR BONE OF RIGHT FOOT: Primary | ICD-10-CM

## 2023-10-02 DIAGNOSIS — M25.571 RIGHT ANKLE PAIN, UNSPECIFIED CHRONICITY: ICD-10-CM

## 2023-10-02 NOTE — PROGRESS NOTES
She continue to work in the [employer] and her boot fell apart. She was having ongoing pain and stiffness of but not requiring crutches and recently she did get a new boot but last evening on 9/19/2023 she went out to her kennel and attempting let her large dogs out and subsequently struck her with a planted foot in the boot laterally involving the right leg. There is no recurrent inversion or eversion and she was in a boot but since that time is have worsening medial ankle pain and some swelling with limited ability to bear weight and she is now back on crutches. Most of her pain is medial in nature and she does have considerable stiffness after spending the last 4-1/2 to 5 weeks in a boot. She has been icing is continue with her anti-inflammatories and is actually worse than her original visit last month. She does have new insurance at this point. Since her injury last night she is complaining of a burning pain medially. She is being seen today for orthopedic and sports reevaluation with updated imaging. Scar Barry in the office on 9/20/2023 and due to persistence of her pain to her right ankle, we did send her for an MRI. Her MRI was obtained at [facility] on 9/26/2023 and did show prominent bone marrow edema involving the majority of the navicular with stress fracturing noted. There is prominent bone bruising through the talus suggestive of a grade 3 stress reaction and bone marrow edema to the calcaneus consistent with stress reaction as well. She did have some posterior tibialis tendinitis but no evidence of split tearing as well as minimal to mild Planter fasciitis with a small ankle joint effusion and diffuse soft tissue swelling.   Clinically she is remains symptomatic despite use of her boot but on questioning she is quite active with her kids playing sports but also has been effectively working full duty in the boot and will have pain going up to about a 7-8 out of 10 towards the end of the

## 2023-10-13 ENCOUNTER — TELEPHONE (OUTPATIENT)
Dept: ORTHOPEDIC SURGERY | Age: 53
End: 2023-10-13

## 2023-10-13 NOTE — TELEPHONE ENCOUNTER
General Question     Subject: Pt NEEDS NEW CAST -   Patient and /or Facility Request: Saeed Ford  Contact Number: [phone]    Pt WAS EXPECTED TO CALL WHEN SWELLING WENT DOWN AND THE CAST WAS TOO BIG. ALSO,THE HEEL IS BROKEN OFF AND NOT SERVING IT'S PURPOSE. WHEN CAN Pt COME IN FOR A RECAST?

## 2023-10-13 NOTE — TELEPHONE ENCOUNTER
CALLED PATIENT AND LET HER KNOW THAT I AM UNAVAILABLE TO REDO HER CAST TODAY HOWEVER, I REACHED OUT TO [facility] AT THE [facility] OFFICE TODAY AND SHE WILL RECAST PATIENT FOR ME. PATIENT STATES SHE WILL BE IN AROUND 11.

## 2023-10-16 ENCOUNTER — OFFICE VISIT (OUTPATIENT)
Dept: ORTHOPEDIC SURGERY | Age: 53
End: 2023-10-16

## 2023-10-16 VITALS — BODY MASS INDEX: 32.14 KG/M2 | HEIGHT: 66 IN | WEIGHT: 200 LBS

## 2023-10-16 DIAGNOSIS — M25.571 RIGHT ANKLE PAIN, UNSPECIFIED CHRONICITY: ICD-10-CM

## 2023-10-16 DIAGNOSIS — M76.821 POSTERIOR TIBIAL TENDINITIS OF RIGHT LOWER EXTREMITY: ICD-10-CM

## 2023-10-16 DIAGNOSIS — M84.374A STRESS FRACTURE OF NAVICULAR BONE OF RIGHT FOOT: Primary | ICD-10-CM

## 2023-10-16 RX ORDER — TRIAMCINOLONE ACETONIDE 1 MG/G
CREAM TOPICAL 2 TIMES DAILY
COMMUNITY
Start: 2023-01-06

## 2023-10-16 RX ORDER — SERTRALINE HYDROCHLORIDE 100 MG/1
1 TABLET, FILM COATED ORAL DAILY
COMMUNITY
Start: 2023-07-05

## 2023-10-16 RX ORDER — LEVOTHYROXINE SODIUM 0.1 MG/1
100 TABLET ORAL DAILY
COMMUNITY

## 2023-10-16 NOTE — PROGRESS NOTES
Chief Complaint    FU Follow-up (R Leg Cast check)    Remote follow-up ongoing right ankle pain with worsening medial ankle pain status post leg contusion 9/19/2023 with limited ability to bear weight. Review of right ankle MRI    History of Present Illness:  Kal Fulton is a 47 y.o. 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 Chey De La Cruz who presents for evaluation of right ankle pain. Patient states she was having pain beginning in June that was initially 2/10 pain. There was no reported injury trauma or no activity prior to becoming symptomatic. Her initial pain was anterior medial in nature. She then went on vacation to the [place] in July, in which there was much walking as well as camping on uneven ground. Her pain became worse afterwards with 7/10 pain. She states she has injured the same ankle before when she stepped into a rabbit hole. She has some relief with Tylenol/ibuprofen. She was seen 8/3 and was given a Medrol pack and walking boot which she says has been helping with her pain. Pain is at the anterior medial portion of her ankle over the posterior tib tendon but also over the medial talar dome. There is no known history of injury although she has had sprains in the past.  She has had very little in the way of treatment. She is being seen today for orthopedic and sports consultation review of her imaging. 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 When we had originally seen her we did place her in a boot which did initially help her symptoms but we had wanted her to get an MRI to more fully evaluate for posterior tib split tearing and/or talar OCD. Apparently she had had a lapse in her insurance and therefore we could not get approval on getting her MRI.   She

## 2023-10-30 ENCOUNTER — OFFICE VISIT (OUTPATIENT)
Dept: ORTHOPEDIC SURGERY | Age: 53
End: 2023-10-30
Payer: COMMERCIAL

## 2023-10-30 VITALS — WEIGHT: 199.96 LBS | BODY MASS INDEX: 32.14 KG/M2 | HEIGHT: 66 IN

## 2023-10-30 DIAGNOSIS — M65.9 SYNOVITIS OF RIGHT ANKLE: ICD-10-CM

## 2023-10-30 DIAGNOSIS — M25.571 RIGHT ANKLE PAIN, UNSPECIFIED CHRONICITY: ICD-10-CM

## 2023-10-30 DIAGNOSIS — M84.374A STRESS FRACTURE OF NAVICULAR BONE OF RIGHT FOOT: Primary | ICD-10-CM

## 2023-10-30 DIAGNOSIS — M76.821 POSTERIOR TIBIAL TENDINITIS OF RIGHT LOWER EXTREMITY: ICD-10-CM

## 2023-10-30 PROCEDURE — 99213 OFFICE O/P EST LOW 20 MIN: CPT | Performed by: FAMILY MEDICINE

## 2023-10-30 NOTE — PROGRESS NOTES
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 Bertram Rangel who presents for evaluation of right ankle pain. Patient states she was having pain beginning in June that was initially 2/10 pain. There was no reported injury trauma or no activity prior to becoming symptomatic. Her initial pain was anterior medial in nature. She then went on vacation to the [place] in July, in which there was much walking as well as camping on uneven ground. Her pain became worse afterwards with 7/10 pain. She states she has injured the same ankle before when she stepped into a rabbit hole. She has some relief with Tylenol/ibuprofen. She was seen 8/3 and was given a Medrol pack and walking boot which she says has been helping with her pain. Pain is at the anterior medial portion of her ankle over the posterior tib tendon but also over the medial talar dome. There is no known history of injury although she has had sprains in the past.  She has had very little in the way of treatment. She is being seen today for orthopedic and sports consultation review of her imaging. 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 When we had originally seen her we did place her in a boot which did initially help her symptoms but we had wanted her to get an MRI to more fully evaluate for posterior tib split tearing and/or talar OCD.   Apparently she had had a lapse in her

## 2023-11-14 NOTE — TELEPHONE ENCOUNTER
Patient call this morning and she need to come in to be seen today she stated she was letting her dogs out and they hit her ankle and now she can not even put any weight on that ankle so I made her and appointment for today at 3:00pm she just would like to know what is the statues with her MRI? Please Advise. patient is admitted will follow up with mom's obgyn.

## 2023-11-20 ENCOUNTER — OFFICE VISIT (OUTPATIENT)
Dept: ORTHOPEDIC SURGERY | Age: 53
End: 2023-11-20

## 2023-11-20 VITALS — HEIGHT: 66 IN | WEIGHT: 199 LBS | BODY MASS INDEX: 31.98 KG/M2

## 2023-11-20 DIAGNOSIS — M76.821 POSTERIOR TIBIAL TENDINITIS OF RIGHT LOWER EXTREMITY: ICD-10-CM

## 2023-11-20 DIAGNOSIS — M65.9 SYNOVITIS OF RIGHT ANKLE: ICD-10-CM

## 2023-11-20 DIAGNOSIS — M84.374A STRESS FRACTURE OF NAVICULAR BONE OF RIGHT FOOT: Primary | ICD-10-CM

## 2023-11-20 DIAGNOSIS — M25.571 RIGHT ANKLE PAIN, UNSPECIFIED CHRONICITY: ICD-10-CM

## 2023-11-20 NOTE — PROGRESS NOTES
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 Fredi Robert who presents for evaluation of right ankle pain. Patient states she was having pain beginning in June that was initially 2/10 pain. There was no reported injury trauma or no activity prior to becoming symptomatic. Her initial pain was anterior medial in nature. She then went on vacation to the [place] in July, in which there was much walking as well as camping on uneven ground. Her pain became worse afterwards with 7/10 pain. She states she has injured the same ankle before when she stepped into a rabbit hole. She has some relief with Tylenol/ibuprofen. She was seen 8/3 and was given a Medrol pack and walking boot which she says has been helping with her pain. Pain is at the anterior medial portion of her ankle over the posterior tib tendon but also over the medial talar dome. There is no known history of injury although she has had sprains in the past.  She has had very little in the way of treatment. She is being seen today for orthopedic and sports consultation review of her imaging. 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 When we had originally seen her we did place her in a boot which did initially help her symptoms but we had wanted her to get an MRI to more fully evaluate for posterior tib split tearing and/or talar OCD.   Apparently she had had a lapse in her